# Patient Record
Sex: FEMALE | Race: WHITE
[De-identification: names, ages, dates, MRNs, and addresses within clinical notes are randomized per-mention and may not be internally consistent; named-entity substitution may affect disease eponyms.]

---

## 2017-02-09 ENCOUNTER — HOSPITAL ENCOUNTER (OUTPATIENT)
Age: 82
Discharge: HOME | End: 2017-02-09
Payer: MEDICARE

## 2017-02-09 DIAGNOSIS — E11.9: Primary | ICD-10-CM

## 2017-02-22 ENCOUNTER — HOSPITAL ENCOUNTER (OUTPATIENT)
Age: 82
Discharge: HOME | End: 2017-02-22
Payer: MEDICARE

## 2017-02-22 DIAGNOSIS — R31.9: Primary | ICD-10-CM

## 2017-06-15 ENCOUNTER — HOSPITAL ENCOUNTER (OUTPATIENT)
Dept: HOSPITAL 76 - LAB.WCP | Age: 82
Discharge: HOME | End: 2017-06-15
Attending: PHYSICIAN ASSISTANT
Payer: MEDICARE

## 2017-06-15 DIAGNOSIS — E11.9: Primary | ICD-10-CM

## 2017-06-15 LAB
ALBUMIN/GLOB SERPL: 1.3 {RATIO} (ref 1–2.2)
ANION GAP SERPL CALCULATED.4IONS-SCNC: 8 MMOL/L (ref 6–13)
BILIRUB BLD-MCNC: 1.2 MG/DL (ref 0.2–1)
BUN SERPL-MCNC: 17 MG/DL (ref 6–20)
CALCIUM UR-MCNC: 9.4 MG/DL (ref 8.5–10.3)
CHLORIDE SERPL-SCNC: 106 MMOL/L (ref 101–111)
CO2 SERPL-SCNC: 27 MMOL/L (ref 21–32)
CREAT SERPLBLD-SCNC: 0.8 MG/DL (ref 0.4–1)
EST. AVERAGE GLUCOSE BLD GHB EST-MCNC: 166 MG/DL (ref 70–100)
GFRSERPLBLD MDRD-ARVRAT: 69 ML/MIN/{1.73_M2} (ref 89–?)
GLOBULIN SER-MCNC: 2.9 G/DL (ref 2.1–4.2)
GLUCOSE SERPL-MCNC: 126 MG/DL (ref 70–100)
HBA1C BLD-MCNC: 0.82 G/DL
POTASSIUM SERPL-SCNC: 4.2 MMOL/L (ref 3.5–5)
PROT SPEC-MCNC: 6.7 G/DL (ref 6.7–8.2)
SODIUM SERPLBLD-SCNC: 141 MMOL/L (ref 135–145)

## 2017-06-15 PROCEDURE — 80053 COMPREHEN METABOLIC PANEL: CPT

## 2017-06-15 PROCEDURE — 36415 COLL VENOUS BLD VENIPUNCTURE: CPT

## 2017-06-15 PROCEDURE — 83036 HEMOGLOBIN GLYCOSYLATED A1C: CPT

## 2017-09-20 ENCOUNTER — HOSPITAL ENCOUNTER (OUTPATIENT)
Dept: HOSPITAL 76 - LAB.WCP | Age: 82
Discharge: HOME | End: 2017-09-20
Attending: PHYSICIAN ASSISTANT
Payer: MEDICARE

## 2017-09-20 DIAGNOSIS — M19.90: ICD-10-CM

## 2017-09-20 DIAGNOSIS — I48.0: Primary | ICD-10-CM

## 2017-09-20 DIAGNOSIS — E11.9: ICD-10-CM

## 2017-09-20 LAB
ALBUMIN/GLOB SERPL: 1.2 {RATIO} (ref 1–2.2)
ANION GAP SERPL CALCULATED.4IONS-SCNC: 7 MMOL/L (ref 6–13)
BASOPHILS NFR BLD AUTO: 0.1 10^3/UL (ref 0–0.1)
BASOPHILS NFR BLD AUTO: 0.8 %
BILIRUB BLD-MCNC: 0.9 MG/DL (ref 0.2–1)
BUN SERPL-MCNC: 15 MG/DL (ref 6–20)
CALCIUM UR-MCNC: 8.9 MG/DL (ref 8.5–10.3)
CHLORIDE SERPL-SCNC: 104 MMOL/L (ref 101–111)
CHOLEST SERPL-MCNC: 164 MG/DL
CO2 SERPL-SCNC: 27 MMOL/L (ref 21–32)
CREAT SERPLBLD-SCNC: 0.7 MG/DL (ref 0.4–1)
EOSINOPHIL # BLD AUTO: 0.1 10^3/UL (ref 0–0.7)
EOSINOPHIL NFR BLD AUTO: 1.3 %
ERYTHROCYTE [DISTWIDTH] IN BLOOD BY AUTOMATED COUNT: 13.3 % (ref 12–15)
EST. AVERAGE GLUCOSE BLD GHB EST-MCNC: 180 MG/DL (ref 70–100)
GFRSERPLBLD MDRD-ARVRAT: 80 ML/MIN/{1.73_M2} (ref 89–?)
GLOBULIN SER-MCNC: 3 G/DL (ref 2.1–4.2)
GLUCOSE SERPL-MCNC: 143 MG/DL (ref 70–100)
HBA1C BLD-MCNC: 0.93 G/DL
HCT VFR BLD AUTO: 42.7 % (ref 37–47)
HDLC SERPL-MCNC: 66 MG/DL
HDLC SERPL: 2.5 {RATIO} (ref ?–4.4)
HGB UR QL STRIP: 14.5 G/DL (ref 12–16)
LDLC/HDLC SERPL: 1.2 {RATIO} (ref ?–4.4)
LYMPHOCYTES # SPEC AUTO: 2.4 10^3/UL (ref 1.5–3.5)
LYMPHOCYTES NFR BLD AUTO: 36 %
MCH RBC QN AUTO: 29.3 PG (ref 27–31)
MCHC RBC AUTO-ENTMCNC: 33.9 G/DL (ref 32–36)
MCV RBC AUTO: 86.5 FL (ref 81–99)
MONOCYTES # BLD AUTO: 0.5 10^3/UL (ref 0–1)
MONOCYTES NFR BLD AUTO: 7.9 %
NEUTROPHILS # BLD AUTO: 3.6 10^3/UL (ref 1.5–6.6)
NEUTROPHILS # SNV AUTO: 6.7 X10^3/UL (ref 4.8–10.8)
NEUTROPHILS NFR BLD AUTO: 54 %
NRBC # BLD AUTO: 0 /100WBC
PDW BLD AUTO: 8.6 FL (ref 7.9–10.8)
POTASSIUM SERPL-SCNC: 3.9 MMOL/L (ref 3.5–5)
PROT SPEC-MCNC: 6.7 G/DL (ref 6.7–8.2)
RBC MAR: 4.93 10^6/UL (ref 4.2–5.4)
SODIUM SERPLBLD-SCNC: 138 MMOL/L (ref 135–145)
TRIGL P FAST SERPL-MCNC: 106 MG/DL
VLDLC SERPL-SCNC: 21 MG/DL
WBC # BLD: 6.7 X10^3/UL

## 2017-09-20 PROCEDURE — 84443 ASSAY THYROID STIM HORMONE: CPT

## 2017-09-20 PROCEDURE — 80053 COMPREHEN METABOLIC PANEL: CPT

## 2017-09-20 PROCEDURE — 36415 COLL VENOUS BLD VENIPUNCTURE: CPT

## 2017-09-20 PROCEDURE — 80061 LIPID PANEL: CPT

## 2017-09-20 PROCEDURE — 85025 COMPLETE CBC W/AUTO DIFF WBC: CPT

## 2017-09-20 PROCEDURE — 83036 HEMOGLOBIN GLYCOSYLATED A1C: CPT

## 2017-09-28 ENCOUNTER — HOSPITAL ENCOUNTER (OUTPATIENT)
Dept: HOSPITAL 76 - DI | Age: 82
Discharge: HOME | End: 2017-09-28
Attending: PHYSICIAN ASSISTANT
Payer: MEDICARE

## 2017-09-28 DIAGNOSIS — Z12.31: Primary | ICD-10-CM

## 2017-09-28 DIAGNOSIS — Z80.3: ICD-10-CM

## 2017-09-28 PROCEDURE — 77067 SCR MAMMO BI INCL CAD: CPT

## 2017-09-29 NOTE — MAMMOGRAPHY REPORT
DIGITAL SCREENING MAMMOGRAM: 09/28/2017

 

CLINICAL INDICATION: An 83-year-old with family history of breast cancer, for screening. 

 

COMPARISON: 07/2016, 11/2013, 10/2012, 10/2011, 09/2010. 

 

TECHNIQUE:  Routine CC and MLO projections were obtained of the breasts.  

 

FINDINGS:  Scattered fibroglandular tissue is present within the breasts. There are no dominant lizeth
s, suspicious microcalcifications, or secondary signs of malignancy. In comparison to the previous st
udies, there are no significant changes.

 

ASSESSMENT:  NO MAMMOGRAPHIC EVIDENCE OF MALIGNANCY.  NO SIGNIFICANT INTERVAL CHANGES.

 

RECOMMENDATION:  Screening mammography is recommended annually.

 

BIRADS category 1 - negative.

 

STANDARD QUALIFYING STATEMENTS

1. This examination was reviewed with the aid of Computed-Aided Detection (CAD).

2. A negative or benign imaging report should not delay biopsy if clinically suspicious findings are 
present. Consider surgical consultation if warranted. More than 5% of cancers are not identified by i
maging.

3. Dense breasts may obscure an underlying neoplasm.

 

 

 

DD:09/29/2017 13:46:42  DT: 09/29/2017 15:31  JOB #: [id number]  EXT JOB #:[id number]

## 2018-01-05 ENCOUNTER — HOSPITAL ENCOUNTER (OUTPATIENT)
Dept: HOSPITAL 76 - LAB.R | Age: 83
Discharge: HOME | End: 2018-01-05
Attending: PHYSICIAN ASSISTANT
Payer: MEDICARE

## 2018-01-05 DIAGNOSIS — R31.9: Primary | ICD-10-CM

## 2018-01-05 PROCEDURE — 87086 URINE CULTURE/COLONY COUNT: CPT

## 2018-01-05 PROCEDURE — 81001 URINALYSIS AUTO W/SCOPE: CPT

## 2018-01-09 ENCOUNTER — HOSPITAL ENCOUNTER (OUTPATIENT)
Dept: HOSPITAL 76 - LAB.WCP | Age: 83
Discharge: HOME | End: 2018-01-09
Attending: PHYSICIAN ASSISTANT
Payer: MEDICARE

## 2018-01-09 DIAGNOSIS — R31.9: Primary | ICD-10-CM

## 2018-01-09 LAB
CLARITY UR REFRACT.AUTO: CLEAR
GLUCOSE UR QL STRIP.AUTO: NEGATIVE MG/DL
KETONES UR QL STRIP.AUTO: NEGATIVE MG/DL
NITRITE UR QL STRIP.AUTO: NEGATIVE
PH UR STRIP.AUTO: 6 PH (ref 5–7.5)
PROT UR STRIP.AUTO-MCNC: NEGATIVE MG/DL
RBC # UR STRIP.AUTO: (no result) /UL
RBC # URNS HPF: (no result) /HPF (ref 0–5)
SP GR UR STRIP.AUTO: 1.01 (ref 1–1.03)
SQUAMOUS URNS QL MICRO: (no result)
UROBILINOGEN UR QL STRIP.AUTO: (no result) E.U./DL
UROBILINOGEN UR STRIP.AUTO-MCNC: NEGATIVE MG/DL

## 2018-01-09 PROCEDURE — 87086 URINE CULTURE/COLONY COUNT: CPT

## 2018-01-09 PROCEDURE — 81001 URINALYSIS AUTO W/SCOPE: CPT

## 2018-04-09 ENCOUNTER — HOSPITAL ENCOUNTER (OUTPATIENT)
Dept: HOSPITAL 76 - LAB.WCP | Age: 83
Discharge: HOME | End: 2018-04-09
Attending: PHYSICIAN ASSISTANT
Payer: MEDICARE

## 2018-04-09 DIAGNOSIS — E11.9: Primary | ICD-10-CM

## 2018-04-09 LAB
ALBUMIN DIAFP-MCNC: 3.7 G/DL (ref 3.2–5.5)
ALBUMIN/GLOB SERPL: 1.3 {RATIO} (ref 1–2.2)
ALP SERPL-CCNC: 75 IU/L (ref 42–121)
ALT SERPL W P-5'-P-CCNC: 21 IU/L (ref 10–60)
ANION GAP SERPL CALCULATED.4IONS-SCNC: 5 MMOL/L (ref 6–13)
AST SERPL W P-5'-P-CCNC: 24 IU/L (ref 10–42)
BILIRUB BLD-MCNC: 0.9 MG/DL (ref 0.2–1)
BUN SERPL-MCNC: 16 MG/DL (ref 6–20)
CALCIUM UR-MCNC: 8.8 MG/DL (ref 8.5–10.3)
CHLORIDE SERPL-SCNC: 105 MMOL/L (ref 101–111)
CHOLEST SERPL-MCNC: 152 MG/DL
CO2 SERPL-SCNC: 29 MMOL/L (ref 21–32)
CREAT SERPLBLD-SCNC: 0.8 MG/DL (ref 0.4–1)
EST. AVERAGE GLUCOSE BLD GHB EST-MCNC: 177 MG/DL (ref 70–100)
GFRSERPLBLD MDRD-ARVRAT: 69 ML/MIN/{1.73_M2} (ref 89–?)
GLOBULIN SER-MCNC: 2.9 G/DL (ref 2.1–4.2)
GLUCOSE SERPL-MCNC: 138 MG/DL (ref 70–100)
HB2 TOTAL: 16.2 G/DL
HBA1C BLD-MCNC: 1 G/DL
HDLC SERPL-MCNC: 62 MG/DL
HDLC SERPL: 2.5 {RATIO} (ref ?–4.4)
HEMOGLOBIN A1C %: 7.8 % (ref 4.6–6.2)
LDLC SERPL CALC-MCNC: 76 MG/DL
LDLC/HDLC SERPL: 1.2 {RATIO} (ref ?–4.4)
PROT SPEC-MCNC: 6.6 G/DL (ref 6.7–8.2)
SODIUM SERPLBLD-SCNC: 139 MMOL/L (ref 135–145)
VLDLC SERPL-SCNC: 14 MG/DL

## 2018-04-09 PROCEDURE — 83036 HEMOGLOBIN GLYCOSYLATED A1C: CPT

## 2018-04-09 PROCEDURE — 83721 ASSAY OF BLOOD LIPOPROTEIN: CPT

## 2018-04-09 PROCEDURE — 36415 COLL VENOUS BLD VENIPUNCTURE: CPT

## 2018-04-09 PROCEDURE — 80053 COMPREHEN METABOLIC PANEL: CPT

## 2018-04-09 PROCEDURE — 80061 LIPID PANEL: CPT

## 2018-07-09 ENCOUNTER — HOSPITAL ENCOUNTER (OUTPATIENT)
Dept: HOSPITAL 76 - LAB.WCP | Age: 83
End: 2018-07-09
Attending: PHYSICIAN ASSISTANT
Payer: MEDICARE

## 2018-07-09 DIAGNOSIS — E11.9: Primary | ICD-10-CM

## 2018-07-09 LAB
ANION GAP SERPL CALCULATED.4IONS-SCNC: 7 MMOL/L (ref 6–13)
BUN SERPL-MCNC: 16 MG/DL (ref 6–20)
CALCIUM UR-MCNC: 9.5 MG/DL (ref 8.5–10.3)
CHLORIDE SERPL-SCNC: 106 MMOL/L (ref 101–111)
CO2 SERPL-SCNC: 26 MMOL/L (ref 21–32)
CREAT SERPLBLD-SCNC: 0.8 MG/DL (ref 0.4–1)
EST. AVERAGE GLUCOSE BLD GHB EST-MCNC: 163 MG/DL (ref 70–100)
GFRSERPLBLD MDRD-ARVRAT: 69 ML/MIN/{1.73_M2} (ref 89–?)
GLUCOSE SERPL-MCNC: 127 MG/DL (ref 70–100)
HB2 TOTAL: 15.6 G/DL
HBA1C BLD-MCNC: 0.88 G/DL
HEMOGLOBIN A1C %: 7.3 % (ref 4.6–6.2)
SODIUM SERPLBLD-SCNC: 139 MMOL/L (ref 135–145)

## 2018-07-09 PROCEDURE — 83036 HEMOGLOBIN GLYCOSYLATED A1C: CPT

## 2018-07-09 PROCEDURE — 36415 COLL VENOUS BLD VENIPUNCTURE: CPT

## 2018-07-09 PROCEDURE — 80048 BASIC METABOLIC PNL TOTAL CA: CPT

## 2018-10-12 ENCOUNTER — HOSPITAL ENCOUNTER (OUTPATIENT)
Dept: HOSPITAL 76 - LAB.WCP | Age: 83
Discharge: HOME | End: 2018-10-12
Attending: PHYSICIAN ASSISTANT
Payer: MEDICARE

## 2018-10-12 DIAGNOSIS — E11.9: Primary | ICD-10-CM

## 2018-10-12 LAB
ALBUMIN DIAFP-MCNC: 3.8 G/DL (ref 3.2–5.5)
ALBUMIN/GLOB SERPL: 1.4 {RATIO} (ref 1–2.2)
ALP SERPL-CCNC: 70 IU/L (ref 42–121)
ALT SERPL W P-5'-P-CCNC: 31 IU/L (ref 10–60)
ANION GAP SERPL CALCULATED.4IONS-SCNC: 7 MMOL/L (ref 6–13)
AST SERPL W P-5'-P-CCNC: 28 IU/L (ref 10–42)
BILIRUB BLD-MCNC: 0.6 MG/DL (ref 0.2–1)
BUN SERPL-MCNC: 21 MG/DL (ref 6–20)
CALCIUM UR-MCNC: 9 MG/DL (ref 8.5–10.3)
CHLORIDE SERPL-SCNC: 106 MMOL/L (ref 101–111)
CHOLEST SERPL-MCNC: 157 MG/DL
CO2 SERPL-SCNC: 27 MMOL/L (ref 21–32)
CREAT SERPLBLD-SCNC: 0.8 MG/DL (ref 0.4–1)
EST. AVERAGE GLUCOSE BLD GHB EST-MCNC: 183 MG/DL (ref 70–100)
GFRSERPLBLD MDRD-ARVRAT: 68 ML/MIN/{1.73_M2} (ref 89–?)
GLOBULIN SER-MCNC: 2.8 G/DL (ref 2.1–4.2)
GLUCOSE SERPL-MCNC: 149 MG/DL (ref 70–100)
HB2 TOTAL: 15.8 G/DL
HBA1C BLD-MCNC: 1.01 G/DL
HDLC SERPL-MCNC: 66 MG/DL
HDLC SERPL: 2.4 {RATIO} (ref ?–4.4)
HEMOGLOBIN A1C %: 8 % (ref 4.6–6.2)
LDLC SERPL CALC-MCNC: 75 MG/DL
LDLC/HDLC SERPL: 1.1 {RATIO} (ref ?–4.4)
PROT SPEC-MCNC: 6.6 G/DL (ref 6.7–8.2)
SODIUM SERPLBLD-SCNC: 140 MMOL/L (ref 135–145)
VLDLC SERPL-SCNC: 16 MG/DL

## 2018-10-12 PROCEDURE — 80061 LIPID PANEL: CPT

## 2018-10-12 PROCEDURE — 83721 ASSAY OF BLOOD LIPOPROTEIN: CPT

## 2018-10-12 PROCEDURE — 36415 COLL VENOUS BLD VENIPUNCTURE: CPT

## 2018-10-12 PROCEDURE — 83036 HEMOGLOBIN GLYCOSYLATED A1C: CPT

## 2018-10-12 PROCEDURE — 80053 COMPREHEN METABOLIC PANEL: CPT

## 2018-12-27 ENCOUNTER — HOSPITAL ENCOUNTER (OUTPATIENT)
Dept: HOSPITAL 76 - LAB | Age: 83
Discharge: HOME | End: 2018-12-27
Attending: SURGERY
Payer: MEDICARE

## 2018-12-27 DIAGNOSIS — Z86.010: ICD-10-CM

## 2018-12-27 DIAGNOSIS — K52.9: Primary | ICD-10-CM

## 2018-12-27 LAB
BASOPHILS NFR BLD AUTO: 0 10^3/UL (ref 0–0.1)
BASOPHILS NFR BLD AUTO: 0.7 %
EOSINOPHIL # BLD AUTO: 0.1 10^3/UL (ref 0–0.7)
EOSINOPHIL NFR BLD AUTO: 1.3 %
ERYTHROCYTE [DISTWIDTH] IN BLOOD BY AUTOMATED COUNT: 13.6 % (ref 12–15)
HGB UR QL STRIP: 14.1 G/DL (ref 12–16)
LYMPHOCYTES # SPEC AUTO: 2.2 10^3/UL (ref 1.5–3.5)
LYMPHOCYTES NFR BLD AUTO: 35.6 %
MCH RBC QN AUTO: 30.4 PG (ref 27–31)
MCHC RBC AUTO-ENTMCNC: 34.7 G/DL (ref 32–36)
MCV RBC AUTO: 87.6 FL (ref 81–99)
MONOCYTES # BLD AUTO: 0.5 10^3/UL (ref 0–1)
MONOCYTES NFR BLD AUTO: 8 %
NEUTROPHILS # BLD AUTO: 3.4 10^3/UL (ref 1.5–6.6)
NEUTROPHILS # SNV AUTO: 6.3 X10^3/UL (ref 4.8–10.8)
NEUTROPHILS NFR BLD AUTO: 54.4 %
PDW BLD AUTO: 8.1 FL (ref 7.9–10.8)
PLATELET # BLD: 242 10^3/UL (ref 130–450)
RBC MAR: 4.65 10^6/UL (ref 4.2–5.4)

## 2018-12-27 PROCEDURE — 36415 COLL VENOUS BLD VENIPUNCTURE: CPT

## 2018-12-27 PROCEDURE — 85025 COMPLETE CBC W/AUTO DIFF WBC: CPT

## 2019-01-02 ENCOUNTER — HOSPITAL ENCOUNTER (OUTPATIENT)
Dept: HOSPITAL 76 - LAB.R | Age: 84
Discharge: HOME | End: 2019-01-02
Attending: SURGERY
Payer: MEDICARE

## 2019-01-02 DIAGNOSIS — K52.9: Primary | ICD-10-CM

## 2019-01-02 DIAGNOSIS — Z12.10: ICD-10-CM

## 2019-01-02 PROCEDURE — 82274 ASSAY TEST FOR BLOOD FECAL: CPT

## 2019-01-25 ENCOUNTER — HOSPITAL ENCOUNTER (OUTPATIENT)
Dept: HOSPITAL 76 - LAB.WCP | Age: 84
Discharge: HOME | End: 2019-01-25
Attending: PHYSICIAN ASSISTANT
Payer: MEDICARE

## 2019-01-25 DIAGNOSIS — E11.9: Primary | ICD-10-CM

## 2019-01-25 LAB
ANION GAP SERPL CALCULATED.4IONS-SCNC: 5 MMOL/L (ref 6–13)
BUN SERPL-MCNC: 17 MG/DL (ref 6–20)
CALCIUM UR-MCNC: 8.9 MG/DL (ref 8.5–10.3)
CHLORIDE SERPL-SCNC: 105 MMOL/L (ref 101–111)
CO2 SERPL-SCNC: 28 MMOL/L (ref 21–32)
CREAT SERPLBLD-SCNC: 0.7 MG/DL (ref 0.4–1)
EST. AVERAGE GLUCOSE BLD GHB EST-MCNC: 166 MG/DL (ref 70–100)
GFRSERPLBLD MDRD-ARVRAT: 80 ML/MIN/{1.73_M2} (ref 89–?)
GLUCOSE SERPL-MCNC: 160 MG/DL (ref 70–100)
HB2 TOTAL: 14.1 G/DL
HBA1C BLD-MCNC: 0.81 G/DL
HEMOGLOBIN A1C %: 7.4 % (ref 4.6–6.2)
SODIUM SERPLBLD-SCNC: 138 MMOL/L (ref 135–145)

## 2019-01-25 PROCEDURE — 36415 COLL VENOUS BLD VENIPUNCTURE: CPT

## 2019-01-25 PROCEDURE — 83036 HEMOGLOBIN GLYCOSYLATED A1C: CPT

## 2019-01-25 PROCEDURE — 80048 BASIC METABOLIC PNL TOTAL CA: CPT

## 2019-04-22 ENCOUNTER — HOSPITAL ENCOUNTER (OUTPATIENT)
Dept: HOSPITAL 76 - LAB.WCP | Age: 84
Discharge: HOME | End: 2019-04-22
Attending: PHYSICIAN ASSISTANT
Payer: MEDICARE

## 2019-04-22 DIAGNOSIS — E11.9: Primary | ICD-10-CM

## 2019-04-22 LAB
ANION GAP SERPL CALCULATED.4IONS-SCNC: 5 MMOL/L (ref 6–13)
BUN SERPL-MCNC: 18 MG/DL (ref 6–20)
CALCIUM UR-MCNC: 8.8 MG/DL (ref 8.5–10.3)
CHLORIDE SERPL-SCNC: 106 MMOL/L (ref 101–111)
CO2 SERPL-SCNC: 27 MMOL/L (ref 21–32)
CREAT SERPLBLD-SCNC: 0.7 MG/DL (ref 0.4–1)
EST. AVERAGE GLUCOSE BLD GHB EST-MCNC: 171 MG/DL (ref 70–100)
GFRSERPLBLD MDRD-ARVRAT: 80 ML/MIN/{1.73_M2} (ref 89–?)
GLUCOSE SERPL-MCNC: 155 MG/DL (ref 70–100)
HB2 TOTAL: 15.5 G/DL
HBA1C BLD-MCNC: 0.92 G/DL
HEMOGLOBIN A1C %: 7.6 % (ref 4.6–6.2)
SODIUM SERPLBLD-SCNC: 138 MMOL/L (ref 135–145)

## 2019-04-22 PROCEDURE — 80048 BASIC METABOLIC PNL TOTAL CA: CPT

## 2019-04-22 PROCEDURE — 83036 HEMOGLOBIN GLYCOSYLATED A1C: CPT

## 2019-04-22 PROCEDURE — 36415 COLL VENOUS BLD VENIPUNCTURE: CPT

## 2019-07-09 ENCOUNTER — HOSPITAL ENCOUNTER (EMERGENCY)
Dept: HOSPITAL 76 - ED | Age: 84
Discharge: HOME | End: 2019-07-09
Payer: MEDICARE

## 2019-07-09 VITALS — DIASTOLIC BLOOD PRESSURE: 81 MMHG | SYSTOLIC BLOOD PRESSURE: 161 MMHG
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
## 2019-07-09 DIAGNOSIS — G89.29: ICD-10-CM
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
## 2019-07-09 DIAGNOSIS — E11.9: ICD-10-CM

## 2019-07-09 DIAGNOSIS — M25.562: ICD-10-CM
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
## 2019-07-09 DIAGNOSIS — Z79.01: ICD-10-CM

## 2019-07-09 DIAGNOSIS — S93.601A: Primary | ICD-10-CM

## 2019-07-09 DIAGNOSIS — I48.91: ICD-10-CM

## 2019-07-09 PROCEDURE — 99282 EMERGENCY DEPT VISIT SF MDM: CPT

## 2019-07-09 PROCEDURE — 99283 EMERGENCY DEPT VISIT LOW MDM: CPT

## 2019-07-09 NOTE — ED PHYSICIAN DOCUMENTATION
PD HPI LOWER EXT INJURY





- Stated complaint


Stated Complaint: LT KNEE PX





- Chief complaint


Chief Complaint: Ext Problem





- History obtained from


History obtained from: Patient





- History of Present Illness


PD HPI LOW EXT INJURY LOCATION: Right, Foot


Type of injury: Other (twisted it going up the stairs)


Where injury occurred: Home


Timing - onset: How many days ago (10)


Timing - duration: Days (10)


Severity Comments: mild


Improved by: Rest


Worsened by: Palpating


Associated symptoms: No: Weakness, Numbness, Tingling, Swelling


Contributing factors: Prior ortho surgery (to right 5th phalanx and metatarsel)


Recently seen: Not recently seen





- Treatment prior to arrival


Treatment prior to arrival: 





ace wrap





Review of Systems


Ten Systems: 10 systems reviewed and negative


Constitutional: denies: Fever


Cardiac: reports: Reviewed and negative


Respiratory: reports: Reviewed and negative


GI: reports: Reviewed and negative


Skin: reports: Reviewed and negative


Musculoskeletal: reports: Joint pain.  denies: Neck pain, Back pain, Extremity 

pain


Neurologic: denies: Focal weakness, Numbness, Syncope, Head injury, LOC





PD PAST MEDICAL HISTORY





- Past Medical History


Past Medical History: Yes


Cardiovascular: Coronary artery disease, Atrial fibrillation


Respiratory: None


Endocrine/Autoimmune: Type 2 diabetes


GI: GERD, Other


: Frequency, Other


HEENT: Macular degeneration


Psych: None


Musculoskeletal: Osteoporosis, Chronic back pain, Other


Derm: None





- Past Surgical History


Past Surgical History: Yes


General: Colonoscopy


Ortho: Other


/GYN: Hysterectomy


Cardiovascular: Coronary stent


HEENT: Cataracts





- Present Medications


Home Medications: 


                                Ambulatory Orders











 Medication  Instructions  Recorded  Confirmed


 


Ezetimibe [Zetia] 10 mg ORAL DAILY 07/15/15 02/15/16


 


Fesoterodine Fumarate [Toviaz] 4 mg ORAL DAILY 07/15/15 02/15/16


 


Fluticasone [Flonase] 2 spray JESSICA DAILY 07/15/15 02/15/16


 


Multivit-Min/FA/Lycopen/Lutein 1 each ORAL DAILY 07/15/15 02/15/16





[Centrum Silver Tablet]   


 


RX: Acyclovir 800 mg ORAL DAILY PRN 07/15/15 02/15/16


 


RX: Lisinopril 10 mg ORAL DAILY 07/15/15 02/15/16


 


RX: Metoprolol Succinate 25 mg ORAL BID 07/15/15 02/15/16


 


Rivaroxaban [Xarelto] 20 mg ORAL DAILY 07/15/15 02/15/16


 


Rosuvastatin Calcium [Crestor] 10 mg ORAL DAILY 07/15/15 02/15/16


 


Omeprazole [PriLOSEC]  02/15/16 02/15/16


 


RX: HYDROcod/ACETAM 5/325 [Norco 1 - 2 ea PO Q6H PRN #15 tablet 02/15/16 





5/325]   














- Allergies


Allergies/Adverse Reactions: 


                                    Allergies











Allergy/AdvReac Type Severity Reaction Status Date / Time


 


No Known Drug Allergies Allergy   Verified 07/09/19 11:20














- Social History


Does the pt smoke?: No


Smoking Status: Never smoker


Does the pt drink ETOH?: Yes


Does the pt have substance abuse?: No





- Immunizations


Immunizations are current?: Yes





- POLST


Patient has POLST: Yes





PD ED PE NORMAL





- Vitals


Vital signs reviewed: Yes





- General


General: Alert and oriented X 3, No acute distress, Well developed/nourished





- HEENT


HEENT: Atraumatic





- Neck


Neck: Supple, no meningeal sign





- Cardiac


Cardiac: RRR





- Respiratory


Respiratory: No respiratory distress





- Abdomen


Abdomen: Non distended





- Female 


Female : Deferred





- Rectal


Rectal: Deferred





- Derm


Derm: Normal color, Warm and dry, No rash





- Extremities


Extremities: No deformity, No edema





- Neuro


Neuro: Alert and oriented X 3


Eye Opening: Spontaneous


Motor: Obeys Commands


Verbal: Oriented


GCS Score: 15





- Psych


Psych: Normal mood, Normal affect





PD ED PE EXPANDED





- Extremities


Extremities: Left knee (no deformity, no swelling, tenderness diffusely but full

ROM with some pain. No ligamentous laxity noted. ).  No: Deformity, Tenderness





Results





- Vitals


Vitals: 


                                     Oxygen











O2 Source                      Room air

















- Rads (name of study)


  ** L knee xray


Radiology: Final report received (negative for acute injury or disease )





PD MEDICAL DECISION MAKING





- ED course


Complexity details: reviewed results, re-evaluated patient, considered 

differential, d/w patient, d/w family


ED course: 





ddx - contusion, sprain, ligamentous knee injury, meniscal injury





85 y/o F with hx and exam as documented. 


No significant trauma, minor twisting injury.


Hx of chronic knee pain, likely acute exacerbation of arthritis.


no fx or dislocation on xray. 


Advised RICE and continued supportive care at home with outpt f/u





Departure





- Departure


Disposition: 01 Home, Self Care


Clinical Impression: 


 Foot sprain





Condition: Stable


Record reviewed to determine appropriate education?: Yes


Instructions:  ED Sprain Foot


Follow-Up: 


Daja Sargent PA-C [Primary Care Provider] - 


Comments: 


There was no evidence of acute fracture or dislocation of your foot.


You should use ice for 20 minutes at a time when resting at home, 2 to 3 times a

day. Take tylenol as needed for pain.


You can walk on the foot as tolerated. 


Follow up with your regular doctor if your symptoms persist after another week 

or 2.


Discharge Date/Time: 07/09/19 13:59

## 2019-07-09 NOTE — XRAY REPORT
Reason:  Pain and swelling after fall

Procedure Date:  07/09/2019   

Accession Number:  [id number] / [id number]                    

Procedure:  XR  - Foot 3 View RT CPT Code:  

 

FULL RESULT:

 

 

EXAM:

RIGHT FOOT RADIOGRAPHY

 

EXAM DATE: 7/9/2019 11:38 AM.

 

CLINICAL HISTORY: Pain and swelling after fall.

 

COMPARISON: None.

 

TECHNIQUE: 3 views.

 

FINDINGS:

Bones: The bones are qualitatively osteopenic; this limits evaluation for 

underlying fractures or masses. The distal portion of the fifth proximal 

phalanx is absent with a soft tissue gap between the base of the fifth 

proximal phalangeal base and the osseous remainder of the fifth ray. Seen 

only on the oblique view, there is irregularity of the third distal 

metatarsal head, which should be correlated for point tenderness. 

Otherwise, there is no definite acute fracture.

 

Joints: There are midfoot degenerative changes, poorly defined due to 

osteopenia. No definite subluxation.

 

Soft Tissues: Normal. No soft tissue swelling.

IMPRESSION:

Correlate the above described one view finding to pain at the ball of the 

third toe.

 

Abnormal absence of the distal aspect of the fifth proximal phalanx 

should be correlated to surgical history. In absence of prior surgical 

intervention, bony destruction due to soft tissue versus infection should 

be suspected.

 

[facility]

## 2019-10-16 ENCOUNTER — HOSPITAL ENCOUNTER (OUTPATIENT)
Dept: HOSPITAL 76 - LAB.WCP | Age: 84
Discharge: HOME | End: 2019-10-16
Attending: PHYSICIAN ASSISTANT
Payer: MEDICARE

## 2019-10-16 DIAGNOSIS — E11.9: Primary | ICD-10-CM

## 2019-10-16 LAB
ANION GAP SERPL CALCULATED.4IONS-SCNC: 8 MMOL/L (ref 6–13)
BUN SERPL-MCNC: 15 MG/DL (ref 6–20)
CALCIUM UR-MCNC: 9.6 MG/DL (ref 8.5–10.3)
CHLORIDE SERPL-SCNC: 106 MMOL/L (ref 101–111)
CO2 SERPL-SCNC: 28 MMOL/L (ref 21–32)
CREAT SERPLBLD-SCNC: 0.8 MG/DL (ref 0.4–1)
EST. AVERAGE GLUCOSE BLD GHB EST-MCNC: 171 MG/DL (ref 70–100)
GFRSERPLBLD MDRD-ARVRAT: 68 ML/MIN/{1.73_M2} (ref 89–?)
GLUCOSE SERPL-MCNC: 138 MG/DL (ref 70–100)
HB2 TOTAL: 14.5 G/DL
HBA1C BLD-MCNC: 0.86 G/DL
HEMOGLOBIN A1C %: 7.6 % (ref 4.6–6.2)
SODIUM SERPLBLD-SCNC: 142 MMOL/L (ref 135–145)

## 2019-10-16 PROCEDURE — 83036 HEMOGLOBIN GLYCOSYLATED A1C: CPT

## 2019-10-16 PROCEDURE — 36415 COLL VENOUS BLD VENIPUNCTURE: CPT

## 2019-10-16 PROCEDURE — 80048 BASIC METABOLIC PNL TOTAL CA: CPT

## 2019-12-31 ENCOUNTER — HOSPITAL ENCOUNTER (OUTPATIENT)
Dept: HOSPITAL 76 - DI | Age: 84
Discharge: HOME | End: 2019-12-31
Attending: GENERAL ACUTE CARE HOSPITAL
Payer: MEDICARE

## 2019-12-31 DIAGNOSIS — Z12.31: Primary | ICD-10-CM

## 2019-12-31 PROCEDURE — 77067 SCR MAMMO BI INCL CAD: CPT

## 2020-01-02 NOTE — MAMMOGRAPHY REPORT
Reason:  ROUTINE SCREENING

Procedure Date:  12/31/2019   

Accession Number:  [id number] / [id number]                    

Procedure:  MARY - Screening Mammo Dig Bilat CPT Code:  

 

***Final Report***

 

 

FULL RESULT:

 

 

EXAM: Screening Mammo Dig Bilat

 

DATE: 12/31/2019 9:29 AM

 

CLINICAL HISTORY:  Screening encounter.

 

TECHNIQUE: (B) - Bilateral  CC and MLO views were obtained.

 

COMPARISON: 9/20/2017 through 9/13/2010.

 

PARENCHYMAL PATTERN: (A) - The breast(s) demonstrate(s) scattered 

fibroglandular densities.

 

FINDINGS:

There are no suspicious masses, calcifications, or areas of distortion.

 

IMPRESSION: Negative examination. BI-RADS category 1.

 

RECOMMENDATION: (ANNUAL)  - Recommend routine annual screening 

mammography.

 

BI-RADS CATEGORY: (1) - Negative.

 

STANDARD QUALIFYING STATEMENTS:

1.  This examination was not reviewed with the aid of Computer-Aided 

Detection (CAD).

2.  A negative or benign  imaging report should not preclude biopsy if 

clinically suspicious findings are present.

3.  Dense breasts may obscure an underlying neoplasm.

4. This examination was reviewed without the aid of 3D breast imaging 

(tomosynthesis).

## 2020-02-13 ENCOUNTER — HOSPITAL ENCOUNTER (OUTPATIENT)
Dept: HOSPITAL 76 - LAB | Age: 85
Discharge: HOME | End: 2020-02-13
Attending: PHYSICIAN ASSISTANT
Payer: MEDICARE

## 2020-02-13 DIAGNOSIS — K52.9: Primary | ICD-10-CM

## 2020-02-13 LAB
ALBUMIN DIAFP-MCNC: 3.9 G/DL (ref 3.2–5.5)
ALBUMIN/GLOB SERPL: 1.6 {RATIO} (ref 1–2.2)
ALP SERPL-CCNC: 63 IU/L (ref 42–121)
ALT SERPL W P-5'-P-CCNC: 32 IU/L (ref 10–60)
ANION GAP SERPL CALCULATED.4IONS-SCNC: 11 MMOL/L (ref 6–13)
AST SERPL W P-5'-P-CCNC: 38 IU/L (ref 10–42)
BASOPHILS NFR BLD AUTO: 0 10^3/UL (ref 0–0.1)
BASOPHILS NFR BLD AUTO: 0.5 %
BILIRUB BLD-MCNC: 1.4 MG/DL (ref 0.2–1)
BUN SERPL-MCNC: 17 MG/DL (ref 6–20)
CALCIUM UR-MCNC: 9.3 MG/DL (ref 8.5–10.3)
CHLORIDE SERPL-SCNC: 105 MMOL/L (ref 101–111)
CO2 SERPL-SCNC: 27 MMOL/L (ref 21–32)
CREAT SERPLBLD-SCNC: 0.9 MG/DL (ref 0.4–1)
EOSINOPHIL # BLD AUTO: 0.1 10^3/UL (ref 0–0.7)
EOSINOPHIL NFR BLD AUTO: 1 %
ERYTHROCYTE [DISTWIDTH] IN BLOOD BY AUTOMATED COUNT: 11.8 % (ref 12–15)
GFRSERPLBLD MDRD-ARVRAT: 60 ML/MIN/{1.73_M2} (ref 89–?)
GLOBULIN SER-MCNC: 2.5 G/DL (ref 2.1–4.2)
GLUCOSE SERPL-MCNC: 106 MG/DL (ref 70–100)
HGB UR QL STRIP: 14.1 G/DL (ref 12–16)
LIPASE SERPL-CCNC: 36 U/L (ref 22–51)
LYMPHOCYTES # SPEC AUTO: 2 10^3/UL (ref 1.5–3.5)
LYMPHOCYTES NFR BLD AUTO: 34.3 %
MCH RBC QN AUTO: 29 PG (ref 27–31)
MCHC RBC AUTO-ENTMCNC: 33.7 G/DL (ref 32–36)
MCV RBC AUTO: 86 FL (ref 81–99)
MONOCYTES # BLD AUTO: 0.6 10^3/UL (ref 0–1)
MONOCYTES NFR BLD AUTO: 10.8 %
NEUTROPHILS # BLD AUTO: 3.1 10^3/UL (ref 1.5–6.6)
NEUTROPHILS # SNV AUTO: 5.8 X10^3/UL (ref 4.8–10.8)
NEUTROPHILS NFR BLD AUTO: 53.2 %
PDW BLD AUTO: 10.2 FL (ref 7.9–10.8)
PLATELET # BLD: 251 10^3/UL (ref 130–450)
PROT SPEC-MCNC: 6.4 G/DL (ref 6.7–8.2)
RBC MAR: 4.87 10^6/UL (ref 4.2–5.4)
SODIUM SERPLBLD-SCNC: 143 MMOL/L (ref 135–145)

## 2020-02-13 PROCEDURE — 80053 COMPREHEN METABOLIC PANEL: CPT

## 2020-02-13 PROCEDURE — 85025 COMPLETE CBC W/AUTO DIFF WBC: CPT

## 2020-02-13 PROCEDURE — 36415 COLL VENOUS BLD VENIPUNCTURE: CPT

## 2020-02-13 PROCEDURE — 83690 ASSAY OF LIPASE: CPT

## 2020-02-27 ENCOUNTER — HOSPITAL ENCOUNTER (OUTPATIENT)
Dept: HOSPITAL 76 - DI | Age: 85
Discharge: HOME | End: 2020-02-27
Attending: PHYSICIAN ASSISTANT
Payer: MEDICARE

## 2020-02-27 DIAGNOSIS — K52.9: Primary | ICD-10-CM

## 2020-02-27 DIAGNOSIS — K76.9: ICD-10-CM

## 2020-02-27 DIAGNOSIS — K86.89: ICD-10-CM

## 2020-02-27 PROCEDURE — 76700 US EXAM ABDOM COMPLETE: CPT

## 2020-02-28 ENCOUNTER — HOSPITAL ENCOUNTER (OUTPATIENT)
Dept: HOSPITAL 76 - LAB.WCP | Age: 85
Discharge: HOME | End: 2020-02-28
Attending: PHYSICIAN ASSISTANT
Payer: MEDICARE

## 2020-02-28 DIAGNOSIS — E11.9: Primary | ICD-10-CM

## 2020-02-28 LAB
ALBUMIN DIAFP-MCNC: 3.5 G/DL (ref 3.2–5.5)
ALBUMIN/GLOB SERPL: 1.3 {RATIO} (ref 1–2.2)
ALP SERPL-CCNC: 61 IU/L (ref 42–121)
ALT SERPL W P-5'-P-CCNC: 56 IU/L (ref 10–60)
ANION GAP SERPL CALCULATED.4IONS-SCNC: 4 MMOL/L (ref 6–13)
AST SERPL W P-5'-P-CCNC: 46 IU/L (ref 10–42)
BILIRUB BLD-MCNC: 0.9 MG/DL (ref 0.2–1)
BUN SERPL-MCNC: 16 MG/DL (ref 6–20)
CALCIUM UR-MCNC: 8.9 MG/DL (ref 8.5–10.3)
CHLORIDE SERPL-SCNC: 108 MMOL/L (ref 101–111)
CHOLEST SERPL-MCNC: 120 MG/DL
CO2 SERPL-SCNC: 29 MMOL/L (ref 21–32)
CREAT SERPLBLD-SCNC: 0.8 MG/DL (ref 0.4–1)
EST. AVERAGE GLUCOSE BLD GHB EST-MCNC: 160 MG/DL (ref 70–100)
GFRSERPLBLD MDRD-ARVRAT: 68 ML/MIN/{1.73_M2} (ref 89–?)
GLOBULIN SER-MCNC: 2.7 G/DL (ref 2.1–4.2)
GLUCOSE SERPL-MCNC: 112 MG/DL (ref 70–100)
HB2 TOTAL: 14 G/DL
HBA1C BLD-MCNC: 0.78 G/DL
HDLC SERPL-MCNC: 50 MG/DL
HDLC SERPL: 2.4 {RATIO} (ref ?–4.4)
HEMOGLOBIN A1C %: 7.2 % (ref 4.6–6.2)
LDLC SERPL CALC-MCNC: 60 MG/DL
LDLC/HDLC SERPL: 1.2 {RATIO} (ref ?–4.4)
PROT SPEC-MCNC: 6.2 G/DL (ref 6.7–8.2)
SODIUM SERPLBLD-SCNC: 141 MMOL/L (ref 135–145)
VLDLC SERPL-SCNC: 10 MG/DL

## 2020-02-28 PROCEDURE — 36415 COLL VENOUS BLD VENIPUNCTURE: CPT

## 2020-02-28 PROCEDURE — 83036 HEMOGLOBIN GLYCOSYLATED A1C: CPT

## 2020-02-28 PROCEDURE — 80061 LIPID PANEL: CPT

## 2020-02-28 PROCEDURE — 83721 ASSAY OF BLOOD LIPOPROTEIN: CPT

## 2020-02-28 PROCEDURE — 80053 COMPREHEN METABOLIC PANEL: CPT

## 2020-02-28 NOTE — ULTRASOUND REPORT
Reason:  DIARRHEA,CHRONIC

Procedure Date:  02/27/2020   

Accession Number:  [id number] / [id number]                    

Procedure:  US  - Abdomen Complete CPT Code:  

 

***Final Report***

 

 

FULL RESULT:

 

 

EXAM:

ABDOMEN ULTRASOUND

 

EXAM DATE: 2/27/2020 10:25 AM.

 

CLINICAL HISTORY: DIARRHEA, CHRONIC.

 

COMPARISON: ABDOMEN/PELVIS W/ 06/20/2014 10:53 AM.

 

TECHNIQUE: Real-time scanning was performed with static images obtained.

 

FINDINGS:

Liver: Hyperechoic lesion in the left hepatic lobe measures 2.8 x 1.2 x 

1.5 cm, without internal color signal. Anechoic cyst adjacent to the 

undersurface of the left hepatic lobe, measures 5 x 6 x 8 mm. 10.4 cm. 

Main portal vein flow: Hepatopetal.

 

Gallbladder: Normal. No stones, wall thickening, or sonographic Kramer's 

sign.

 

Biliary System: Common bile duct measures 7 mm. No intrahepatic or 

extrahepatic ductal dilatation.

 

Pancreas: Hypoechoic nodule in the pancreatic neck, measures 7 x 6 x 8 

mm, corresponds to fat on CT dated 06/20/2014, possible small lipoma or 

interdigitating fat

 

Kidneys:

Right: 9.7 cm longitudinally. Normal. No contour-deforming mass, stones, 

or hydronephrosis.

Left: 9.5 cm longitudinally. Normal. No contour-deforming mass, stones, 

or hydronephrosis.

 

Spleen: 9.5 x 3.4 x 4.2 cm for a volume of 72 mL. Calcifications in the 

spleen, consistent with calcified granulomas, most commonly in the 

setting of prior granulomatous infection

 

Aorta and Inferior Vena Cava: Aortic atherosclerotic disease present

 

Other: None.

IMPRESSION:

1. Hyperechoic lesion in the left hepatic lobe is indeterminate, not 

previously seen. Could be focal fat or hemangioma though is incompletely 

characterized. Dedicated liver MRI could fully characterize this lesion.

2. Small hypoechoic nodule in the pancreatic neck, corresponds to small 

lipoma or interdigitating fat on CT.

3. Calcified splenic granulomas, commonly seen in the setting of prior 

granulomatous infection

 

[facility]

## 2020-05-29 ENCOUNTER — HOSPITAL ENCOUNTER (OUTPATIENT)
Dept: HOSPITAL 76 - LAB.WCP | Age: 85
Discharge: HOME | End: 2020-05-29
Attending: PHYSICIAN ASSISTANT
Payer: MEDICARE

## 2020-05-29 DIAGNOSIS — E11.9: Primary | ICD-10-CM

## 2020-05-29 LAB
ANION GAP SERPL CALCULATED.4IONS-SCNC: 5 MMOL/L (ref 6–13)
BUN SERPL-MCNC: 17 MG/DL (ref 6–20)
CALCIUM UR-MCNC: 9.3 MG/DL (ref 8.5–10.3)
CHLORIDE SERPL-SCNC: 107 MMOL/L (ref 101–111)
CO2 SERPL-SCNC: 28 MMOL/L (ref 21–32)
CREAT SERPLBLD-SCNC: 0.8 MG/DL (ref 0.4–1)
EST. AVERAGE GLUCOSE BLD GHB EST-MCNC: 148 MG/DL (ref 70–100)
GLUCOSE SERPL-MCNC: 127 MG/DL (ref 70–100)
HB2 TOTAL: 14.2 G/DL
HBA1C BLD-MCNC: 0.72 G/DL
HEMOGLOBIN A1C %: 6.8 % (ref 4.6–6.2)
SODIUM SERPLBLD-SCNC: 140 MMOL/L (ref 135–145)

## 2020-05-29 PROCEDURE — 80048 BASIC METABOLIC PNL TOTAL CA: CPT

## 2020-05-29 PROCEDURE — 83036 HEMOGLOBIN GLYCOSYLATED A1C: CPT

## 2020-05-29 PROCEDURE — 36415 COLL VENOUS BLD VENIPUNCTURE: CPT

## 2020-11-12 ENCOUNTER — HOSPITAL ENCOUNTER (OUTPATIENT)
Dept: HOSPITAL 76 - DI | Age: 85
Discharge: HOME | End: 2020-11-12
Attending: PHYSICIAN ASSISTANT
Payer: MEDICARE

## 2020-11-12 DIAGNOSIS — E78.5: ICD-10-CM

## 2020-11-12 DIAGNOSIS — I25.10: Primary | ICD-10-CM

## 2020-11-12 DIAGNOSIS — Z82.49: ICD-10-CM

## 2020-11-12 DIAGNOSIS — Z95.5: ICD-10-CM

## 2020-11-12 DIAGNOSIS — I10: ICD-10-CM

## 2020-11-12 PROCEDURE — 93017 CV STRESS TEST TRACING ONLY: CPT

## 2020-11-12 PROCEDURE — 93018 CV STRESS TEST I&R ONLY: CPT

## 2020-11-12 PROCEDURE — 93016 CV STRESS TEST SUPVJ ONLY: CPT

## 2020-11-12 PROCEDURE — 78452 HT MUSCLE IMAGE SPECT MULT: CPT

## 2020-11-12 NOTE — CARDIAC PROCEDURE NOTE
DATE OF SERVICE: 11/12/2020

Physician: Sunni Reed MD, [facility]

 

INDICATION:  Coronary artery disease (CAD).

 

CARDIAC RISK FACTORS:  Advanced age, hypertension, hyperlipidemia, and family 
history of heart disease.  The patient has known coronary artery disease with 
coronary stents in her past history.

 

DESCRIPTION OF PROCEDURE:  After signing informed consent, the patient underwent
a modified Car protocol treadmill stress test with nuclear myocardial 
perfusion imaging.

 

RESTING HEART RATE: 76.    PEAK HEART RATE: 122 (91% predicted maximum heart 
rate for age).

 

RESTING BLOOD PRESSURE: 168/82.   PEAK BLOOD PRESSURE: 214/79.

 

The patient exercised for 4 minutes on a modified Car protocol that was 
entirely in stage I.  The patient developed "heartburn" with exertion and had 
mild shortness of breath.  She rated her perceived exertion at 17/20 on the Mary
scale at peak.  Oxygen saturation was 98-99% throughout the test on room air.  
During exercise, rare PVCs became more frequent, there were 2 ventricular 
couplets and one ventricular triplet (multifocal).

 

RESTING EKG:  Atrial fibrillation, rare PVC, inferolateral scooping ST segments.

 

EKG AT PEAK:  More frequent PVCs, ventricular couplets and one ventricular 
triplet was seen, no new ST segment or T-wave changes from the baseline 
abnormality.

 

SUMMARY: 

1.  Abnormal resting EKG.

2.  Cannot comment on ST segment changes due to baseline ST segment 
abnormalities present.

3.  Increased ventricular ectopy during exercise.

4.  New symptom of "heartburn" and mild shortness of breath occurred with 
exercise.

5.  Poor exercise tolerance.

6.  Poor blood pressure control.

7.  Nuclear images showed no fixed or reversible perfusion defects, and normal 
LV systolic function.

 

IMPRESSION:

Normal stress test.



RECOMMENDATIONS:

Better BP control.

Please note that the patient exhibits signs of dementia and she was driving 
alone to this test, which should be prohibited.





cc: Daja Sargent PA-C

DD: 11/12/2020 15:30

TD: 11/12/2020 15:36

Job #: [id number]

[facility] Center[facility]ITO

## 2020-11-13 NOTE — NUCLEAR MEDICINE REPORT
PROCEDURE:  

Rest and exercise myocardial perfusion SPECT with gated imaging and ejection fraction

 

INDICATIONS:  CAD

 

RADIOPHARMACEUTICAL:  8.6 mCi Tc-99m Myoview IV at rest and 25.8 mCi Tc-99m Myoview IV at peak exerci
se.  One-day-protocol was performed.  

 

TECHNIQUE:  Radiopharmaceutical was injected at peak stress test, and also at rest.  SPECT images wer
e obtained.  SPECT myocardial perfusion images were displayed in short axis, horizontal long axis, an
d vertical long axis views.  Gated images were reviewed using Auto[employer] software.  

 

COMPARISON:   None available.

 

FINDINGS:  

 

Raw data:  There is good myocardial labeling by radiotracer.  No significant motion artifacts.  Lung-
to-heart ratio is 0.35 (normal is less than 0.38 for tetrafosmin tracer).  

 

Left ventricle function:  Gated images demonstrate normal left ventricle wall thickening.  No segment
al wall motion abnormality.  No transient ischemic dilation; TID is 1.27 (normal less than 1.3).  The
 left ventricle resting end-diastolic volume is normal.  Left ventricle stress ejection fraction is >
 70%; normal values are above 45%.  

 

Myocardial perfusion:  There is normal distribution of activity in the left and right ventricular adeola
cardium.  No fixed or reversible perfusion defects.

 

IMPRESSION:  

1. Normal myocardial perfusion images.

2. Normal left ventricular volume and systolic function.

3. Please correlate with stress ECG report.

 

PQRS ATTESTATIONS:  

Measure 322 - Is this imaging test primarily performed on a low-risk surgery patient for preoperative
 evaluation within 30 days preceding their low-risk non-cardiac surgery?  Low-risk surgery is defined
 as cardiac death or myocardial infarction less than 1%, including (but not limited to) endoscopic pr
ocedures, superficial procedures, cataract surgery, and excisional breast surgery:  Answer:  No

Measure 323 - Is this imaging test performed primarily for the monitoring of an asymptomatic patient 
who had percutaneous coronary intervention on the visit date or within 2 years of the visit date?  An
swer:  No

Measure 324 - Is this imaging test performed primarily for the initial detection and risk assessment 
on an asymptomatic, low coronary heart disease patient?  Low CHD risk definition = clinicians should 
consider the maximum number of available patient factors used to estimate risk based on Fifield (A
TP III criteria), typically age, gender, diabetes, smoking status, and use of blood pressure medicati
on, and integrate age appropriate estimates for missing elements, such as LDL or standard blood press
ure.  Answer:  No

 

Reviewed by: Jenifer Lockwood MD on 11/13/2020 1:43 PM PST

Approved by: Jenifer Lockwood MD on 11/13/2020 1:43 PM PST

 

 

Station ID:  [id number]

## 2021-01-27 ENCOUNTER — HOSPITAL ENCOUNTER (OUTPATIENT)
Dept: HOSPITAL 76 - DI | Age: 86
Discharge: HOME | End: 2021-01-27
Attending: PHYSICIAN ASSISTANT
Payer: MEDICARE

## 2021-01-27 DIAGNOSIS — I25.10: ICD-10-CM

## 2021-01-27 DIAGNOSIS — I67.82: ICD-10-CM

## 2021-01-27 DIAGNOSIS — R41.3: Primary | ICD-10-CM

## 2021-01-27 NOTE — CT REPORT
PROCEDURE:  HEAD WO

 

INDICATIONS:  MEMORY LOSS

 

TECHNIQUE:  

Noncontrast 4.5 mm thick angled axial sections acquired from the foramen magnum to the vertex.  For r
adiation dose reduction, the following was used:  automated exposure control, adjustment of mA and/or
 kV according to patient size.

 

COMPARISON:  None.

 

FINDINGS:  

Image quality:  Excellent.  

 

CSF spaces:  Basal cisterns are patent.  No extra-axial fluid collections.  Ventricles are normal in 
size and shape.  

 

Brain:  No midline shift.  No intracranial masses or hemorrhage.  Gray-white matter interface is norm
al.  Age-related volume loss and mild small vessel ischemic change. Carotid and vertebral artery calc
ifications.

 

Skull and face:  Calvarium and visualized facial bones are intact, without suspicious lesions.  

 

Sinuses:  Visualized sinuses and mastoids are clear.  

 

IMPRESSION:  

1. Age-related volume loss and mild small vessel ischemic change.

2. No evidence acute stroke, hemorrhage, or mass.

3. ASCVD.

 

Reviewed by: Esdras Graff MD on 1/27/2021 3:24 PM PST

Approved by: Esdras Graff MD on 1/27/2021 3:24 PM PST

 

 

Station ID:  [id number]

## 2021-02-12 ENCOUNTER — HOSPITAL ENCOUNTER (OUTPATIENT)
Dept: HOSPITAL 76 - LAB.WCP | Age: 86
Discharge: HOME | End: 2021-02-12
Attending: PHYSICIAN ASSISTANT
Payer: MEDICARE

## 2021-02-12 DIAGNOSIS — E11.9: ICD-10-CM

## 2021-02-12 DIAGNOSIS — I25.10: Primary | ICD-10-CM

## 2021-02-12 DIAGNOSIS — K21.9: ICD-10-CM

## 2021-02-12 LAB
ALBUMIN DIAFP-MCNC: 4.1 G/DL (ref 3.2–5.5)
ALBUMIN/GLOB SERPL: 1.4 {RATIO} (ref 1–2.2)
ALP SERPL-CCNC: 82 IU/L (ref 42–121)
ALT SERPL W P-5'-P-CCNC: 35 IU/L (ref 10–60)
ANION GAP SERPL CALCULATED.4IONS-SCNC: 10 MMOL/L (ref 6–13)
AST SERPL W P-5'-P-CCNC: 25 IU/L (ref 10–42)
BASOPHILS NFR BLD AUTO: 0.1 10^3/UL (ref 0–0.1)
BASOPHILS NFR BLD AUTO: 1 %
BILIRUB BLD-MCNC: 1.3 MG/DL (ref 0.2–1)
BUN SERPL-MCNC: 16 MG/DL (ref 6–20)
CALCIUM UR-MCNC: 9.4 MG/DL (ref 8.5–10.3)
CHLORIDE SERPL-SCNC: 103 MMOL/L (ref 101–111)
CHOLEST SERPL-MCNC: 164 MG/DL
CO2 SERPL-SCNC: 27 MMOL/L (ref 21–32)
CREAT SERPLBLD-SCNC: 0.8 MG/DL (ref 0.4–1)
EOSINOPHIL # BLD AUTO: 0.1 10^3/UL (ref 0–0.7)
EOSINOPHIL NFR BLD AUTO: 1 %
ERYTHROCYTE [DISTWIDTH] IN BLOOD BY AUTOMATED COUNT: 12 % (ref 12–15)
GLOBULIN SER-MCNC: 3 G/DL (ref 2.1–4.2)
GLUCOSE SERPL-MCNC: 169 MG/DL (ref 70–100)
HBA1C MFR BLD HPLC: 7.4 % (ref 4.27–6.07)
HDLC SERPL-MCNC: 71 MG/DL
HDLC SERPL: 2.3 {RATIO} (ref ?–4.4)
HGB UR QL STRIP: 15 G/DL (ref 12–16)
LDLC SERPL CALC-MCNC: 75 MG/DL
LDLC/HDLC SERPL: 1.1 {RATIO} (ref ?–4.4)
LYMPHOCYTES # SPEC AUTO: 2.4 10^3/UL (ref 1.5–3.5)
LYMPHOCYTES NFR BLD AUTO: 33.1 %
MCH RBC QN AUTO: 29.8 PG (ref 27–31)
MCHC RBC AUTO-ENTMCNC: 33.1 G/DL (ref 32–36)
MCV RBC AUTO: 90.1 FL (ref 81–99)
MONOCYTES # BLD AUTO: 0.6 10^3/UL (ref 0–1)
MONOCYTES NFR BLD AUTO: 8.1 %
NEUTROPHILS # BLD AUTO: 4 10^3/UL (ref 1.5–6.6)
NEUTROPHILS # SNV AUTO: 7.1 X10^3/UL (ref 4.8–10.8)
NEUTROPHILS NFR BLD AUTO: 56.7 %
PDW BLD AUTO: 10.1 FL (ref 7.9–10.8)
PLATELET # BLD: 255 10^3/UL (ref 130–450)
PROT SPEC-MCNC: 7.1 G/DL (ref 6.7–8.2)
RBC MAR: 5.03 10^6/UL (ref 4.2–5.4)
VLDLC SERPL-SCNC: 18 MG/DL

## 2021-02-12 PROCEDURE — 83036 HEMOGLOBIN GLYCOSYLATED A1C: CPT

## 2021-02-12 PROCEDURE — 85025 COMPLETE CBC W/AUTO DIFF WBC: CPT

## 2021-02-12 PROCEDURE — 80053 COMPREHEN METABOLIC PANEL: CPT

## 2021-02-12 PROCEDURE — 80061 LIPID PANEL: CPT

## 2021-02-12 PROCEDURE — 83721 ASSAY OF BLOOD LIPOPROTEIN: CPT

## 2021-02-12 PROCEDURE — 36415 COLL VENOUS BLD VENIPUNCTURE: CPT

## 2021-03-01 ENCOUNTER — HOSPITAL ENCOUNTER (OUTPATIENT)
Dept: HOSPITAL 76 - LAB.WCP | Age: 86
Discharge: HOME | End: 2021-03-01
Attending: PHYSICIAN ASSISTANT
Payer: MEDICARE

## 2021-03-01 DIAGNOSIS — K52.9: Primary | ICD-10-CM

## 2021-03-01 PROCEDURE — 36415 COLL VENOUS BLD VENIPUNCTURE: CPT

## 2021-03-01 PROCEDURE — 81599 UNLISTED MAAA: CPT

## 2021-03-01 PROCEDURE — 83516 IMMUNOASSAY NONANTIBODY: CPT

## 2021-03-01 PROCEDURE — 86255 FLUORESCENT ANTIBODY SCREEN: CPT

## 2021-04-06 ENCOUNTER — HOSPITAL ENCOUNTER (OUTPATIENT)
Age: 86
Discharge: HOME | End: 2021-04-06
Payer: MEDICARE

## 2021-04-06 PROCEDURE — 99345 HOME/RES VST NEW HIGH MDM 75: CPT

## 2021-04-06 NOTE — CONSULTATION NOTE
Palliative Care Consultation





- Referral


Referring Provider: Daja Sargent PA-C


Time of Visit: 1598-4036


Referral setting: Home


Referral Reason: Dementia without behavioral disturbances/Atrial Fib/Pal Care





- Information Sources


Records reviewed: Previous records reviewed


History/Review of Systems obtained from: Patient, Family (daughter Randi and 

Thao present)


Exam limitations: Clinical condition (patient with significant STM issues)





- History of Present Illness


Brief History of Present Illness: 


This is an 86-year-old woman who presents with worsening memory problems, most 

likely exacerbated both by depression and her current social situation.  In 

reflection with family, patient's memory issues have most likely been present 

for 3 or 4 years, but have come to light more acutely as 's health has 

continued to deteriorate over last several months, he is currently on hospice, 

and family helping to manage current situation.  As most, patient and her 

 have been isolated from family given Covid pandemic, from March to 

October, as the situation had worsened, until 's health became a crisis.





Patient does have slight insight into her memory issues, they are looking at 

placement for her, as expect him to pass in the next few weeks.  He has been 

doing the cooking, managing the house and finances, she is able to feed herself,

ambulate, noted concern regarding changes in bathing practices, as well as 

toileting.  She is oriented to place, she admits only being oriented to time 

given she has her reminder clock and it has been difficult managing her meds. 

She has some recall of long-term events, much less recall of short term. Family 

are calling and reminding her for medications and using technical support with 

the med dispenser.  Patient does not present with ability to live independently,

does need cueing and reminding for most ADLs, she would be a good candidate for 

assisted living. She currently lives in a very large house, would not be able to

manage long-term, high risk for falls as is multilevel.They are currently 

narrowed down to to assisted living facilities, and are requesting advice 

regarding transition points.





Patient also presents with persistent feelings of depression, admits to being 

overwhelmed with her current situation and thought of leaving her home, family 

note that she took a turn for the worse with the death of her son 4 years ago.  

She was started on sertraline in January, with some improvement, but does admit 

when she is overwhelmed, just likes to go to sleep.  She has been sleeping more,

more withdrawn, family been present trying to deal with the current crisis with 

her  on hospice, and have noted her fairly quick decline over the last 

several months.  Patient presents on the FAS T scale as a 6A.








Medical/Surgical History





- Past Medical History


Cardiovascular: reports: Hypertension, Coronary artery disease, Atrial 

fibrillation


Respiratory: reports: None


Neuro: Dementia


Endocrine/Autoimmune: reports: Type 2 diabetes


GI: reports: GERD, Chronic diarrhea


: reports: Incontinence, Frequency


HEENT: reports: Macular degeneration


Psych: reports: Depression


Musculoskeletal: reports: Osteoarthritis, Osteoporosis, Fatigue, Chronic back 

pain


Derm: reports: None


MRSA Hx?: No





- Past Surgical History


General: reports: Colonoscopy


Ortho: reports: Other


/GYN: reports: Hysterectomy


Cardiovascular: reports: Coronary stent


HEENT: reports: Cataracts





Social History





- Living Situation


Living arrangement: At home


Living Situation: With spouse/s.o.


Support System: 


Patient lives with her  of 61 years, he is currently on hospice, with 

expected rapid decline.  He was in the , they have lived on [facility] for 20 years.  Have a very large older home with multiple levels, he has 

been doing the cooking and oversight for several years now.  Patient has 2 

daughters Thao and Randi, unfortunately now faced with a crisis.  Patient will 

need to be placed, looking at assisted living, with the reality to transition to

memory care as her disease progresses.








Family History





- Family History


Family History: Mother: , CAD, MI (son  at 54 MI), Father: ,

Other family: , MI





Medications/Allergies





- Medications


Home Medications: 


                                Ambulatory Orders











 Medication  Instructions  Recorded  Confirmed


 


Multivit-Min/FA/Lycopen/Lutein 1 each ORAL DAILY 07/15/15 04/07/21





[Centrum Silver Tablet]   


 


Rivaroxaban [Xarelto] 20 mg ORAL DAILY 07/15/15 04/07/21


 


Omeprazole [PriLOSEC] 20 mg PO DAILY 02/15/16 04/07/21


 


Lisinopril [Zestril] 2.5 mg PO DAILY 21


 


Metoprolol Succinate [Toprol Xl] 25 mg PO DAILY 21


 


Sertraline [Zoloft] 25 mg PO DAILY 21














- Allergies


Allergies/Adverse Reactions: 


                                    Allergies











Allergy/AdvReac Type Severity Reaction Status Date / Time


 


No Known Drug Allergies Allergy   Verified 19 11:20














Review of Systems





- Constitutional


Constitutional: reports: Fatigue, Weight stable.  denies: Fever, Chills





- Eyes


Eyes: reports: Vision loss





- Ears, Nose & Throat


Ears, Nose & Throat: reports: Hearing loss, Hearing aids (doesn't wear)





- Cardiovascular


Cardiovascular: reports: Exertional dyspnea.  denies: Chest pain, Edema





- Respiratory


Respiratory: denies: Cough, SOB at rest





- Gastrointestinal


Gastrointestinal: reports: Diarrhea.  denies: Abdominal pain





- Genitourinary


Genitourinary: reports: Incontinence





- Musculoskeletal


Musculoskeletal: reports: Muscle aches, Stiffness, Muscle weakness





- Integumentary


Integumentary: reports: Dryness





- Neurological


Neurological: reports: General weakness, Memory problems





- Psychiatric


Psychiatric: reports: Depression, Anxiety





- Hematologic/Lymphatic


Hematologic/Lymph: denies: Recurrent infections





- All Other Systems


All Other Systems: reports: Other (defers to daughters)





Physical Exam





- Vital Signs


Temperature: 97.2 C


Pulse Rate: 72


Respiratory Rate: 18


O2 Saturation: 98 (ra@ rest)


Blood Pressure: 122/72





- Physical Exam


General Appearance: positive: No acute distress, Alert, Anxious


Eyes Bilateral: positive: Normal inspection


ENT: positive: No signs of dehydration


Neck: positive: Trachea midline


Cardiovascular: positive: Irregularly irregular


Respiratory: positive: No respiratory distress, Breath sounds nml


Abdomen: positive: Non-tender, Soft, Nml bowel sounds


Skin: positive: Pallor, Dryness


Extremities: positive: No pedal edema


Neurologic/Psychiatric: positive: Oriented x3, Depressed mood/affect, Flat 

affect





Palliative Care





- POLST


Patient has POLST: No


Pain: Pain unchanged, Location (knees; left greater than right)


Tiredness/Fatigue: Moderate (4-6)


Drowsiness/Sedation: Moderate (4-6) (sleeps when feeling overwhelmed)


Nausea: None


Anorexia: Mild (1-3)


Dyspnea: None


Depression: Moderate (4-6)


Anxiety: Moderate (4-6)


Feelings of wellbeing/Perceived Quality of Life: Fair, Worsening


Sleep: Sleeps well


Constipation: No


Performance Status: 


Patient is slow and purposeful in her ambulation, she does have a slight 

shuffling gait. She has been independently showering, the concern about her 

being able to pick out clothing, and dress. Patient is not managing any IADLs, 

and would not most likely be able to do own food prep. Noted she is able to 

engage in conversation, poor short-term memory, does miss her coffee gatherings 

with her friends.








- Palliative Care


Discussion: 


Unfortunately I am meeting patient in the midst of crisis, they are looking at 

placement for her given her  is on hospice and expected to die the next 

couple weeks. My understanding of the relationship is quite complicated, and 

have lived somewhat parallel lives especially the last few years. Daughter's 

perception is patient is ambivalent about being present for his end-of-life, and

 wondering about placing her prior to his death. Patient of course would like to

 stay in her own home, but does not have any insight into the complexities of 

being able to manage independently, and does understand the concern for her to 

be there by herself by her family.





Recommended given patient's level of dementia, safety concerns, would recommend 

placement at assisted living. They are looking specifically for something that 

has a graduated tier, including being able to transition over to memory care. I 

am in agreement with this, there weighing between [facility] assisted living, which 

does have a few of her friends nearby, versus [facility] closer to her sister 

and family. Recommend they look at it in the context of socialization, as this 

most likely would be most supportive of patient given her most recent isolation,

 expressed interest of spending time with friends and family, and ability to 

transition. As far as a question about whether prior to his death or not, would 

recommend secure housing, and transition into new setting if possible before his

 death, she can always return and spend time in the situation if that is her 

wish.





Did explore patient's current's concerns and anxieties, patient has difficulty 

processing the complexity of her current situation. She is quite trusting of her

 family, and has resigned herself to transition. Reinforced and supported this 

transition, acknowledging the difficulty it was for her emotionally. We also 

discussed some the grief and loss she has felt with the loss of her son, and 

just the changes she is experienced through her aging process.








Impression and Recommendations





- Palliative Care


Impression: 


This is an 86-year-old woman who presents with moderate dementia, most likely 

mixed vascular Alzheimer's. She has had steady decline, and reflection, most 

likely has had progressive changes over the last 4 years. Unfortunately she 

presents in a complex situation, needing placement given the expected decline 

with her  on hospice. Patient also has depression, atrial fib, but 

otherwise is quite functional and able to engage in conversation. She would do 

well in an assisted living situation. Palliative care providing support her 

decisions for transition, advanced care planning, and anticipatory guidance.





Recommendations/Counseling Done: 


1. Depression. Patient on very low-dose sertraline 25 mg, patient most likely be

 able to tolerate and improve on higher dosing, will recommend increase after 

patient is settled in new setting. Patient may improve just from increased 

socialization, after initial adjustment. We will continue to monitor at this 

point in time. Provided time to normalize counseling regarding grief and loss, 

even at this stage.





2. Dementia without behavioral disturbances. Patient presents with most likely 

mixed Alzheimer's/vascular. She has had steady decline, more acutely with social

 isolation and current complex social situation. Patient's original MMSE in 

January was 20. Given the current chaos in crisis, will defer further formal 

testing until settled in new setting. Daughter is aware of natural progression 

of disease, though most likely could use more support and anticipatory guidance 

in the future.





3. Generalized weakness. Patient has been quite sedentary, has been active in 

the past. She may actually benefit from physical therapy support in her new 

setting, or if they have active seniors program. Patient has always been quite 

active most of her life, this would be of benefit to continue to support her 

overall health. Reviewed current set up for house, very high risk for falls 

given multiple levels, stairs, and patient's functional decline. Recommendations

 made, but patient most likely should be on one-story floor for safety.





4. Advanced care planning. Daughters are working on getting a DPOA, have 

appointment this afternoon, as they are trying to finalize both financial and 

legal documents to be able to support both her and her  as they are in 

this complex situation. They have been quite resistant up to this point in time 

and planning for their end-of-life. Daughter feels patient is quite resistant 

and fearful of death and dying, patient most likely is going to need a POLST in 

her transition to a new setting. Did offer to return and have a sit down 

conversation with patient, regarding goals and completion of this. Palliative 

care role explained, spent time setting up rapport with patient, will continue 

to follow and less [place].





75 minutes with greater than 50% of this done in counseling regarding goals of 

care, safety, role of palliative care, and anticipatory guidance with 

recommendation for assisted living placement.

## 2021-05-14 ENCOUNTER — HOSPITAL ENCOUNTER (OUTPATIENT)
Dept: HOSPITAL 76 - LAB.WCP | Age: 86
Discharge: HOME | End: 2021-05-14
Attending: PHYSICIAN ASSISTANT
Payer: MEDICARE

## 2021-05-14 DIAGNOSIS — E11.9: Primary | ICD-10-CM

## 2021-05-14 DIAGNOSIS — R41.3: ICD-10-CM

## 2021-05-14 LAB
ANION GAP SERPL CALCULATED.4IONS-SCNC: 7 MMOL/L (ref 6–13)
BUN SERPL-MCNC: 18 MG/DL (ref 6–20)
CALCIUM UR-MCNC: 9.3 MG/DL (ref 8.5–10.3)
CHLORIDE SERPL-SCNC: 107 MMOL/L (ref 101–111)
CO2 SERPL-SCNC: 27 MMOL/L (ref 21–32)
CREAT SERPLBLD-SCNC: 0.9 MG/DL (ref 0.4–1)
CREAT UR-SCNC: 131.8 MG/DL
EST. AVERAGE GLUCOSE BLD GHB EST-MCNC: 163 MG/DL (ref 70–100)
GFRSERPLBLD MDRD-ARVRAT: 59 ML/MIN/{1.73_M2} (ref 89–?)
GLUCOSE SERPL-MCNC: 127 MG/DL (ref 70–100)
HBA1C MFR BLD HPLC: 7.3 % (ref 4.27–6.07)
MICROALBUMIN UR-MCNC: 0.8 MG/DL (ref 0–300)
MICROALBUMIN/CREAT RATIO PNL UR: 6.1 UG/MG (ref ?–30)
POTASSIUM SERPL-SCNC: 4.1 MMOL/L (ref 3.5–5)
SODIUM SERPLBLD-SCNC: 141 MMOL/L (ref 135–145)

## 2021-05-14 PROCEDURE — 83036 HEMOGLOBIN GLYCOSYLATED A1C: CPT

## 2021-05-14 PROCEDURE — 80048 BASIC METABOLIC PNL TOTAL CA: CPT

## 2021-05-14 PROCEDURE — 82570 ASSAY OF URINE CREATININE: CPT

## 2021-05-14 PROCEDURE — 36415 COLL VENOUS BLD VENIPUNCTURE: CPT

## 2021-05-14 PROCEDURE — 82607 VITAMIN B-12: CPT

## 2021-05-14 PROCEDURE — 82043 UR ALBUMIN QUANTITATIVE: CPT

## 2021-07-07 ENCOUNTER — HOSPITAL ENCOUNTER (OUTPATIENT)
Dept: HOSPITAL 76 - PC | Age: 86
Discharge: HOME | End: 2021-07-07
Attending: NURSE PRACTITIONER
Payer: MEDICARE

## 2021-07-07 DIAGNOSIS — I10: ICD-10-CM

## 2021-07-07 DIAGNOSIS — F32.9: ICD-10-CM

## 2021-07-07 DIAGNOSIS — Z66: ICD-10-CM

## 2021-07-07 DIAGNOSIS — Z51.5: Primary | ICD-10-CM

## 2021-07-07 DIAGNOSIS — Z79.01: ICD-10-CM

## 2021-07-07 DIAGNOSIS — F03.91: ICD-10-CM

## 2021-07-07 DIAGNOSIS — Z91.83: ICD-10-CM

## 2021-07-07 DIAGNOSIS — I48.91: ICD-10-CM
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
## 2021-07-22 ENCOUNTER — HOSPITAL ENCOUNTER (OUTPATIENT)
Dept: HOSPITAL 76 - PC | Age: 86
Discharge: HOME | End: 2021-07-22
Attending: NURSE PRACTITIONER
Payer: MEDICARE

## 2021-07-22 DIAGNOSIS — F03.91: ICD-10-CM

## 2021-07-22 DIAGNOSIS — Z66: ICD-10-CM

## 2021-07-22 DIAGNOSIS — Z91.83: ICD-10-CM

## 2021-07-22 DIAGNOSIS — I48.91: ICD-10-CM

## 2021-07-22 DIAGNOSIS — Z51.5: Primary | ICD-10-CM

## 2021-07-22 DIAGNOSIS — Z79.01: ICD-10-CM

## 2021-07-22 DIAGNOSIS — I10: ICD-10-CM

## 2021-07-22 NOTE — CONSULTATION NOTE
Palliative Care Follow Up





- Referral


Referring Provider: Daja Sargent PA-C


Time of Visit: 3123-2458


Referral setting: Assisted living


Referral Reason: Dementia with behavioral disturbances/Pal Care





- Information Sources


Records reviewed: Previous records reviewed


History/Review of Systems obtained from: Patient, Family (spoke with daughter 

Thao), Nursing (staff at facility)


Exam limitations: Clinical condition (patient with advanced dementia)





- History of Present Illness Update


Brief HPI Update: 


This is a 86-year-old woman who presents with dementia with behavioral 

disturbances, now placed in memory care. She has had worsening memory issues, 

depression, and significant decline specifically over the last few weeks to 

months. This is thought to originally be exacerbated by the loss of her son, 

then transition of her  to hospice. The family originally placed here in 

[facility] assisted living, unfortunately she was found wandering and unable to 

find her way back, given her safety issues and deterioration of mental status 

she was transition to memory care. She actually has done fairly well here, is 

not oriented to place, but is not at all distressed with her current situation. 

On approach today, she is able to engage in social conversation, though she is 

disoriented to time place and person. She does report to me her   

yesterday, when tried to redirect, she was still staying with the story. She 

reports she was somewhat sad, but then went on to discuss her distress with her 

relationship with him reflecting that he was but basically an angry person, 

though he was a good [employer] man.





She has very little insight into her dementia, though she does report "not very 

sharp right now". She is having some delusions about her situation, but denies 

being afraid or worried, reports people are nice and friendly, she denies 

trouble sleeping, and food is been good. Her weight has remained stable.





Past Medical History: 


Hypertension, CAD, atrial fib, dementia, type 2 diabetes, GERD, chronic 

diarrhea, incontinence, macular degeneration, depression, osteoarthritis, 

osteoporosis, fatigue, chronic back pain, "abdominal polyps".








Social History





- Living Situation


Living arrangement: Assisted living (Memory Care)


Support System: 


Patient continues to reflect on her very difficult marriage relationship with 

her , he is currently on hospice though today she thinks he  

yesterday. She has a daughter Thao who lives near [place], who has come 

several times to visit as well as a sister and some grandchildren. She has 

another daughter Randi who has not been able to come because of family issues, 

but is planning to come out and see her. Staff are very complementary of her, 

and feels she is adjusting well. She is easy to redirect, and does not seem 

distressed in her current environment.








Medications/Allergies





- Medications


Home Medications: 


                                Ambulatory Orders











 Medication  Instructions  Recorded  Confirmed


 


Rivaroxaban [Xarelto] 20 mg ORAL DAILY 07/15/15 07/22/21


 


Metoprolol Succinate [Toprol Xl] 12.5 mg PO DAILY 21


 


Sertraline [Zoloft] 12.5 mg PO DAILY 21


 


Acetaminophen [Acetaminophen Extra 500 mg PO Q4HR PRN 21





Strength]   


 


Famotidine [Pepcid] 20 mg PO BID 21


 


Loperamide [Imodium] 2 mg PO DAILY PRN 21














- Allergies


Allergies/Adverse Reactions: 


                                    Allergies











Allergy/AdvReac Type Severity Reaction Status Date / Time


 


No Known Drug Allergies Allergy   Verified 19 11:20














Review of Systems





- Constitutional


Constitutional: reports: Fatigue, Weight stable (120.2).  denies: Fever, Chills





- Eyes


Eyes: reports: Vision loss





- Ears, Nose & Throat


Ears, Nose & Throat: reports: Hearing loss, Hearing aids (doesn't wear)





- Cardiovascular


Cardiovascular: reports: Exertional dyspnea.  denies: Chest pain, Edema





- Gastrointestinal


Gastrointestinal: reports: Other (unable to report).  denies: Abdominal pain, 

Diarrhea (denies diarrhea)





- Genitourinary


Genitourinary: reports: Incontinence





- Musculoskeletal


Musculoskeletal: reports: Muscle aches, Stiffness, Muscle weakness





- Integumentary


Integumentary: reports: Dryness





- Neurological


Neurological: reports: General weakness, Memory problems (worsening;)





- Psychiatric


Psychiatric: reports: Depression, Anxiety





- All Other Systems


All Other Systems: reports: Other (limited ROS related to memory)





Physical Exam





- Vital Signs


Temperature: 97.3 C


Pulse Rate: 88


Respiratory Rate: 16


O2 Saturation: 98 (ra @ rest)


Blood Pressure: 118/72





- Physical Exam


General Appearance: positive: No acute distress, Alert


Eyes Bilateral: positive: Normal inspection


Neck: positive: Trachea midline


Cardiovascular: positive: Irregular


Respiratory: positive: No respiratory distress, Breath sounds nml


Abdomen: positive: Non-tender, Soft, Nml bowel sounds


Skin: positive: Pallor, Dryness


Extremities: positive: No pedal edema


Neurologic/Psychiatric: positive: Mood/affect nml, Disoriented to person, 

Disoriented to place, Disoriented to time, Flat affect





Palliative Care





- POLST


Patient has POLST: Yes


POLST Status: DNR, Comfort Measures


Pain: No pain


Feelings of wellbeing/Perceived Quality of Life: Good, Acceptable, Improved (per

 daughters perception)


Sleep: Sleeps well


Performance Status: 


Patient is ambulatory, does need cueing and direction for timing of meals, and 

bathing. She has not had any falls, does appear stable on her feet. We will 

continue to monitor.








- Palliative Care


Discussion: 


Met with patient, she is very friendly and engaged, does not remember me. Though

 seems very comfortable with having conversation, she was reflective of her 

 "who  yesterday" though he has not, reports she was sad, but went on

 to explain their very difficult relationship. She is having some delusions, she

 is having word finding problems, there is something about the kitchen, and 

difficulty really identifying who else is part of the setting. She reports 

everybody is nice and friendly, she did remember her daughters had come and set 

up a garden. She wanted to show me this. She showed me her shoes, she likes to 

dress nice, loves complements regarding her outfits. I did not see any distress 

or concerns despite thinking her  had . She does have pictures up in 

her room, was able to identify most of them, and was quite reflective of her 

loss of her son, reporting "should not of happen" as far as losing her kids 

overall.








Impression and Recommendations





- Palliative Care


Impression: 


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 

Alzheimer's/vascular with significant decline, wondering if she had a TIA or 

some neurologic event. She is now in memory care, she does have neuropsychiatric

 behaviors of wandering, but no agitation, she is having delusions, no 

significant anxiety, and does appear actually quite lighter and less depressed. 

Goals remain to focus on comfort focused treatment, avoiding hospitalization and

 end-of-life respectful death without prolongation of suffering. Palliative care

 providing support for patient and family in the context of this transition to 

memory care, monitoring of symptoms, and anticipatory guidance with family.





Recommendations/Counseling Done: 


1. Atrial fib. Patient remains at risk for recurrent TIAs is currently on 

Xarelto. Patient is not having any falls currently, counseling provided 

regarding benefit versus burden in patients with dementia, will continue to 

treat at this point in time and keep her metoprolol at low-dose 12.5 mg.





2. Hypertension. Lisinopril had been discharged continued, patient's blood 

pressure within normal range 118/72, no further issues.





3. Dementia with behavioral disturbances. Patient with mild delusions, does not 

seem to be bothered by them, has not presented with any behavioral issues to 

staff. At this point in time we will continue the sertraline at 12.5 mg, 

discussed this with the daughter given patient's history of depression and 

anxiety will leave at current dosing as she is tolerating without problems. If 

to add something for behaviors or delusions, would look at low-dose quetiapine 

or as needed low-dose Haldol.





4. Advanced care planning. Patient does have POLST with DN AR/DNI and comfort 

focused treatment, patient is doing well in her transition to the memory care 

unit. Daughter and family are feeling better that this is a good fit, and quite 

pleased to see her more happy and engaged. There is lots of grief and sadness 

regarding just the difficulty and hard relationship with her father, but feeling

 like she is in a good place and continue to provide her support.





45 minutes with greater than 50% of this done in counseling regarding 

anticipatory guidance with the daughter, evaluation of patient in current 

setting, coordination of care with care staff.

## 2021-08-05 ENCOUNTER — HOSPITAL ENCOUNTER (OUTPATIENT)
Dept: HOSPITAL 76 - PC | Age: 86
Discharge: HOME | End: 2021-08-05
Attending: NURSE PRACTITIONER
Payer: MEDICARE

## 2021-08-05 DIAGNOSIS — Z66: ICD-10-CM

## 2021-08-05 DIAGNOSIS — L03.114: ICD-10-CM

## 2021-08-05 DIAGNOSIS — Z51.5: Primary | ICD-10-CM

## 2021-08-05 DIAGNOSIS — F03.90: ICD-10-CM

## 2021-08-05 DIAGNOSIS — N39.0: ICD-10-CM

## 2021-08-05 NOTE — CONSULTATION NOTE
Palliative Care Follow Up





- Referral


Referring Provider: Daja Sargent PA-C


Time of Visit: 2723-6941


Referral setting: Assisted living


Referral Reason: Left hand trauma injury/Cellulitis/Contusion left hand/Dementia





- Information Sources


Records reviewed: Previous records reviewed


History/Review of Systems obtained from: Patient, Family (daughter Randi 

present), Nursing (April ABRAN)


Exam limitations: Clinical condition (patient with dementia)





- History of Present Illness Update


Brief HPI Update: 


This is an 86-year-old woman who has had a series of unfortunate events, she is 

a resident of [facility].  She has had worsening memory 

issues, significant decline, both functionally and cognitively over the last few

months.  Most recently  was contacted by staff, concern for increased 

lethargy, confusion, altered from her baseline.  They did do a urine dip with 

pending positive results, patient does have a history of UTIs, though in follow-

up last one treated was .  After discussion with daughter, did start her on 

Bactrim DS 1 tab twice daily with an order for a UA for C&S.





Patient had a fall, as it appears she was leaving the facility, fell on her knee

and most likely broke her fall with her left hand.  Was notified she had an 

abrasion as well as a small skin tear.  Was called by the daughter and concern 

related to worsening confusion yesterday, was speaking "gibberish".  Seemed 

worse and concerned about worsening UTI.  Did arrange for follow-up visit today,

patient is doing much better.  On arrival, she is talking with her daughter 

Randi, does admit to feeling poorly yesterday, but has no short-term memory in 

the context of her fall, or how she has been feeling the last couple days.  She 

denies any dysuria, she does admit to pain in her left hand, on examination does

have significant swelling, small cut over her left thumb, darkened contusion in 

the palm, some warmth and swelling over the thumb base streaking up into left 

wrist.  She does have mobility, it is uncomfortable, though not painful at rest.

 Her right knee does have a foam dressing, dressing is intact denies pain at 

this site.  She does complain of some Pain above her right elbow, though no 

contusion noted nor impacting mobility.  Patient is able to get from sitting to 

standing, is afebrile, does not appear to be in any distress.  Abdomen is soft 

no pain with deep palpation.  Patient is able to form full sentences, is not 

oriented to place, does have some delusions but better than last time I saw her.





Conversation with first Randi and then Thao regarding conservative approach.  

After much discussion given patient's improvement on antibiotics as well as 

concern for cellulitis in that left thumb area, will switch antibiotics to 

cephalexin 500 mg every 8 hours for 7 days, given the worsening contusion and 

bruising will hold Xarelto for a week, will schedule acetaminophen 3 times daily

for 1 week, and if patient tolerates can use ice packs.  After much discussion 

given her treatment plan, would not change if had x-ray.  She is easily 

confused, given her confusion is better, will recommend urgent care if needs to 

leave the facility, otherwise will evaluate more conservative approach.


Past Medical History: 


Hypertension, CAD, atrial fib, dementia, type 2 diabetes, GERD, chronic 

diarrhea, incontinence, macular degeneration, depression, osteoarthritis, 

osteoporosis, fatigue, chronic back pain, "abdominal polyps"








Social History





- Living Situation


Living arrangement: Assisted living (Memory Care)


Support System: 


Patient is newly admitted to memory care, does appear to be having mixed 

response.  For the most part has been settling in, though daughter reports she 

had been packing up and was "bolting" when she had her fall.  She does have a 

daughter Thao who lives there [place], and her daughter Randi for [place] 

is here to see her shortly.  Patient has had a misconception her  has 

, they are currently reporting he is transitioning on hospice.








Medications/Allergies





- Medications


Home Medications: 


                                Ambulatory Orders











 Medication  Instructions  Recorded  Confirmed


 


Rivaroxaban [Xarelto] 20 mg ORAL DAILY MDD HOLD FOR 7 07/15/15 08/05/21





 DAYS  


 


Metoprolol Succinate [Toprol Xl] 12.5 mg PO DAILY 21


 


Sertraline [Zoloft] 12.5 mg PO DAILY 21


 


Acetaminophen [Acetaminophen Extra 1,000 mg PO TID 21





Strength]   


 


Famotidine [Pepcid] 20 mg PO BID 21


 


Loperamide [Imodium] 2 mg PO DAILY PRN 21


 


cephALEXin [Keflex] 500 mg PO TID MDD 7 days 21














- Allergies


Allergies/Adverse Reactions: 


                                    Allergies











Allergy/AdvReac Type Severity Reaction Status Date / Time


 


No Known Drug Allergies Allergy   Verified 19 11:20














Review of Systems





- Constitutional


Constitutional: reports: Fatigue, Weight loss (117).  denies: Fever, Chills





- Eyes


Eyes: reports: Vision loss





- Ears, Nose & Throat


Ears, Nose & Throat: reports: Hearing loss, Hearing aids (doesn't wear)





- Cardiovascular


Cardiovascular: reports: Irregular heart rate, Exertional dyspnea.  denies: 

Chest pain, Edema





- Respiratory


Respiratory: denies: SOB at rest





- Gastrointestinal


Gastrointestinal: reports: Other (unable to report).  denies: Abdominal pain, 

Diarrhea (denies diarrhea)





- Genitourinary


Genitourinary: reports: Incontinence.  denies: Dysuria





- Musculoskeletal


Musculoskeletal: reports: Muscle aches, Stiffness, Muscle weakness





- Integumentary


Integumentary: reports: Dryness, Other (right knee abrasion; left thumb skin 

tear)





- Neurological


Neurological: reports: General weakness, Memory problems (worsening;fluctuating)





- Psychiatric


Psychiatric: reports: Depression, Anxiety





- Endocrine


Endocrine: reports: Diabetes type 2





- All Other Systems


All Other Systems: reports: Other (limited ROS related to memory)





Physical Exam





- Vital Signs


Temperature: 97.2 C


Pulse Rate: 72


Respiratory Rate: 16


O2 Saturation: 97 (ra @ rest)


Blood Pressure: 144/80





- Physical Exam


General Appearance: positive: No acute distress, Alert.  negative: Anxious


Eyes Bilateral: positive: Normal inspection


ENT: positive: No signs of dehydration


Neck: positive: Trachea midline


Cardiovascular: positive: Irregular


Respiratory: positive: No respiratory distress, Breath sounds nml


Abdomen: positive: Non-tender, Soft, Nml bowel sounds


Skin: positive: Pallor, Dryness


Extremities: positive: No pedal edema, Other (see HPI left hand with 

contusion/cellulits)


Neurologic/Psychiatric: positive: Mood/affect nml, Disoriented to person, 

Disoriented to place, Disoriented to time, Flat affect





Palliative Care





- POLST


Patient has POLST: Yes


POLST Status: DNR, Selective Treatment


Pain: Location (left hand worst; right knee)


Feelings of wellbeing/Perceived Quality of Life: Fair, Acceptable


Performance Status: 


Patient is ambulatory, has not had falls prior to this in the facility.  Does 

appear related to her trying to "escape" on her way out the door.  She is able 

to get from sitting to standing, she does have limited use of her left hand now.

  Does need some assistance with dressing but has been able to feed self.








- Palliative Care


Discussion: 


Patient seems quite calm, enjoying her visit with her daughter.  Is not oriented

 to place or time, but is able to carry on social conversation.  Does not seem 

in any distress over her injury, discussed plans in front of her, seems quite 

accepting.  Discussed with both Randi and Thao a plan trying to balance with 

least amount of distress and be conservative as well as responding to her 

current injury and high risk for sequela of further issues regarding this.  

Thao is quite clear wants treatment for things that can be reversed, and would 

consider seeking further aggressive treatment if indicated. But both daughters 

in agreement would not be a surgical candidate and if can manage conservatively 

what they currently want for her. 








Impression and Recommendations





- Palliative Care


Impression: 


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 

Alzheimer's/vascular with continued concern for recurrent TIAs or other 

neurologic events.  Had attributed most recent changes to UTI, had improved some

 on antibiotics but urine was negative with less than 10,000 colonies of bac

teria.  She is doing much better today, as far as alertness, conversational, 

does not appear in any distress.  She does have a left hand trauma injury from 

her fall on , with contusion, possibly fracture, and now presenting with 

symptoms of cellulitis.  Goals remain to focus on comfort focused treatment, 

avoiding hospitalization.  Palliative care making a visit to evaluate and 

consider palliative approach for managing current acute event





Recommendations/Counseling Done: 


1. Left hand traumatic injury.  Patient presents with severe contusion bruising 

and swelling, does have some mobility, but does present with some increased 

redness and heat over the left thumb base, does have a small skin tear above 

this.  This is increased over the last couple days, will go ahead and hold 

Xarelto for a week as suspect this is adding to the hematoma, will treat with 

cephalexin 500 mg 3 times daily for a week to address cellulitis, after much 

discussion weighing benefits and burdens, will defer x-ray for now, and 

encouraged if patient able to tolerate ice for comfort and schedule 

acetaminophen 1000 mg 3 times daily.  If worsens or increased concerns, 

recommended can take him to urgent care clinic for x-ray and further evaluation.





2.  Altered mental status.  This is originally thought possibly related to UTI, 

patient does have a history in the past, has since cleared almost back to 

baseline.  Did get a UA for C&S, shows less than 10,000 bacteria, though 

positive dip.  Will be switching to cephalexin, will cover UTI as well as 

cellulitis.  Patient had been described as "bolting", patient currently is 

distracted and cooperative with staff.  Continue to weigh benefits and burdens 

of adding as needed Zyprexa, or if patient's paranoia and delusions worsen can 

add scheduled small dose at bedtime to help with sleep.





3.  Advanced care planning.  Continue to discuss goals of care, palliative 

approach,  is currently actively dying, patient with very little 

understanding but does come in and out as far as participating in conversations 

regarding his decline.  She does understand he is in hospice, and intermittently

 thinks he is already .





45 minutes with greater than 50% of this done in counseling regarding goals of 

care, management of acute with palliative conservative approach, weighing 

benefits and burdens of interventions, coordination of care with daughters and 

facility staff

## 2021-09-02 ENCOUNTER — HOSPITAL ENCOUNTER (OUTPATIENT)
Dept: HOSPITAL 76 - PC | Age: 86
Discharge: HOME | End: 2021-09-02
Attending: NURSE PRACTITIONER
Payer: MEDICARE

## 2021-09-02 DIAGNOSIS — I10: ICD-10-CM

## 2021-09-02 DIAGNOSIS — F41.9: ICD-10-CM

## 2021-09-02 DIAGNOSIS — Z66: ICD-10-CM

## 2021-09-02 DIAGNOSIS — S00.83XA: ICD-10-CM

## 2021-09-02 DIAGNOSIS — E11.9: ICD-10-CM

## 2021-09-02 DIAGNOSIS — Z91.81: ICD-10-CM

## 2021-09-02 DIAGNOSIS — L84: ICD-10-CM

## 2021-09-02 DIAGNOSIS — I25.10: ICD-10-CM

## 2021-09-02 DIAGNOSIS — F03.91: ICD-10-CM

## 2021-09-02 DIAGNOSIS — I48.91: ICD-10-CM

## 2021-09-02 DIAGNOSIS — M54.9: ICD-10-CM

## 2021-09-02 DIAGNOSIS — Z91.83: ICD-10-CM

## 2021-09-02 DIAGNOSIS — K52.9: ICD-10-CM
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
## 2021-09-02 DIAGNOSIS — H91.90: ICD-10-CM

## 2021-09-02 DIAGNOSIS — K21.9: ICD-10-CM

## 2021-09-02 DIAGNOSIS — R32: ICD-10-CM

## 2021-09-02 DIAGNOSIS — F32.9: ICD-10-CM

## 2021-09-02 DIAGNOSIS — M81.0: ICD-10-CM

## 2021-09-02 DIAGNOSIS — G89.29: ICD-10-CM

## 2021-09-02 DIAGNOSIS — R53.83: ICD-10-CM

## 2021-09-02 DIAGNOSIS — H35.30: ICD-10-CM

## 2021-09-02 DIAGNOSIS — Z51.5: Primary | ICD-10-CM
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
## 2021-09-02 DIAGNOSIS — Z79.01: ICD-10-CM

## 2021-09-02 NOTE — CONSULTATION NOTE
Palliative Care Follow Up





- Referral


Referring Provider: Daja Sargent PA-C


Time of Visit: 1084-6930


Referral setting: Assisted living


Referral Reason: Chin Contusion/Dementia





- Information Sources


Records reviewed: RN notes reviewed, Previous records reviewed


History/Review of Systems obtained from: Patient, Nursing


Exam limitations: Clinical condition (patient with dementia)





- History of Present Illness Update


Brief HPI Update: 


This is a 87-year-old woman who has had a series of unfortunate events, she is a

resident of [facility].  She was placed here as a result of

worsening cognitive decline, mild behavior issues in the context of wandering, 

paranoia, and delusions.  She had been settling in, but unfortunately on , 

fell forward flat on her face, with a significant contusion on her chin.  

Patient has been on Xarelto for her atrial fib, though have been on hold for 

previous fall  as result of severe contusion of her left hand.  This is 

restarted after week, as patient had improved, unclear if patient had had a TIA 

or UTI, was treated with antibiotics clinically as unable to get a adequate 

urine specimen.





Patient's chin is quite tender, bruised and swollen and, pooling occurred down 

into the neck area covering about 40%.  She does have extensive bruising across 

jawline as well though this is fading.  Patient does recall falling flat on her 

face, reports her knees are tender as well, though no bruising noted.





Patient has been working with physical therapy, she does have a shuffling 

somewhat ataxic gait, she moves very quickly.  She seems quite impulsive, her 

daughter reflects she is always been very active and "gets ahead of herself".  

Her cognitive status fluctuates as well as her mood, she does have intermittent 

delusions.  She is not oriented to place, though she knows she is here because 

of falling at home.  She believes her father had just , though after 

partially through the visit, she recognize she was "84 years old" and how could 

her father still be alive.  She then attributed her  had , this is 

been fairly consistent.  She does not seem upset or distressed by this, she is 

quite focused on having to sell the house.


Past Medical History: 


Hypertension, CAD, atrial fib, dementia, type 2 diabetes, GERD, chronic 

diarrhea, incontinence, macular degeneration, depression, osteoarthritis, 

osteoporosis, fatigue, chronic back pain, "abdominal polyps".








Social History





- Living Situation


Living arrangement: Assisted living (Memory Care)


Support System: 


Patient had originally been moved to the assisted living side, but because of 

her worsening memory and neuropsychiatric behaviors, was admitted to memory care

end of July.  Her  is still at home, he is currently on hospice, has had 

a fairly slow decline but is changing currently.  She has a daughter Thao who 

lives in [place], who is trying to coordinate care for both father and mother

in separate settings and her daughter Randi in [place], who has been out 

frequently to provide support and caregiving too








Medications/Allergies





- Medications


Home Medications: 


                                Ambulatory Orders











 Medication  Instructions  Recorded  Confirmed


 


Metoprolol Succinate [Toprol Xl] 12.5 mg PO DAILY 21


 


Sertraline [Zoloft] 12.5 mg PO DAILY 21


 


Acetaminophen [Acetaminophen Extra 500 mg PO Q4HR PRN 21





Strength]   


 


Famotidine [Pepcid] 20 mg PO BID 21


 


Loperamide [Imodium] 2 mg PO DAILY PRN 21














- Allergies


Allergies/Adverse Reactions: 


                                    Allergies











Allergy/AdvReac Type Severity Reaction Status Date / Time


 


No Known Drug Allergies Allergy   Verified 19 11:20














Review of Systems





- Constitutional


Constitutional: reports: Fatigue, Weight stable (120).  denies: Fever, Chills





- Eyes


Eyes: reports: Vision loss





- Ears, Nose & Throat


Ears, Nose & Throat: reports: Hearing loss, Hearing aids (doesn't wear)





- Cardiovascular


Cardiovascular: reports: Irregular heart rate, Exertional dyspnea.  denies: 

Chest pain, Edema





- Respiratory


Respiratory: denies: Cough





- Gastrointestinal


Gastrointestinal: reports: Good appetite.  denies: Abdominal pain, Diarrhea 

(denies diarrhea)





- Genitourinary


Genitourinary: reports: Incontinence.  denies: Dysuria





- Musculoskeletal


Musculoskeletal: reports: Muscle aches, Stiffness, Muscle weakness





- Integumentary


Integumentary: reports: Dryness





- Neurological


Neurological: reports: General weakness, Memory problems (worsening;fluctuating)





- Psychiatric


Psychiatric: reports: Depression, Anxiety, Delusions





- Endocrine


Endocrine: reports: Diabetes type 2





- Hematologic/Lymphatic


Hematologic/Lymph: reports: Bruising





- All Other Systems


All Other Systems: reports: Other (limited ROS related to memory)





Physical Exam





- Vital Signs


Temperature: 97.3 C


Pulse Rate: 62


Respiratory Rate: 18


O2 Saturation: 99 (ra @ rest)


Blood Pressure: 132/72





- Physical Exam


General Appearance: positive: No acute distress, Alert.  negative: Anxious


Eyes Bilateral: positive: Normal inspection


ENT: positive: No signs of dehydration


Neck: positive: Trachea midline


Cardiovascular: positive: Irregular


Respiratory: positive: No respiratory distress, Breath sounds nml


Abdomen: positive: Non-tender, Soft, Nml bowel sounds


Skin: positive: Pallor, Dryness, Bruising (chin see HPI)


Extremities: positive: No pedal edema, Other (left hand healed; no residual 

pain)


Neurologic/Psychiatric: positive: Mood/affect nml, Disoriented to person, 

Disoriented to place, Disoriented to time





Palliative Care





- POLST


Patient has POLST: Yes


POLST Status: DNR, Selective Treatment


Pain: Location (tender chin; feet okay today)


Feelings of wellbeing/Perceived Quality of Life: Fair, Acceptable, Worsening


Performance Status: 


Patient's functional status fluctuates, but is ambulatory.  She is able to self 

feed, and also toilet herself.  She does have "pads" for her intermittent 

incontinence.  She does need some cueing and assistance regarding dressing as 

well as bathing.








- Palliative Care


Discussion: 


Patient is easily engaged in conversation, she does not really recognize ARNP, 

but is comfortable with questions and exam.  She does not seem at all 

distressed, the beginning of visit was talking about her father who just , 

but later recognized she was talking about her .  She reflects that "life

 is changing", she knows she needs to sell the house, but is unclear how all of 

this fits together.  She does not identify as being here as being home, but 

reports that everyone is "real nice".





Spoke with Thao, they continue to struggle with how to include patient in 

's decline.  Reviewed patient does not perceive any distress with talking

 about 's death, but he has been sick a long time, and this was expected.

  Patient remains at significant high risk for falls, have visited this question

 before weighing benefits and burdens of continuing on the Xarelto, patient has 

had significant injuries both times as a result.  Reviewed my recommendation at 

this point in time to discontinue, and daughter in agreement.








Impression and Recommendations





- Palliative Care


Impression: 


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 

Alzheimer's/vascular with continued concern for recurrent TIAs and/or other 

neurologic events.  She has had fluctuating both cognitive and functional 

status, has had another fall  resulting in severe contusion of her chin.  

Given this, with discussion with daughter burdens outweigh benefits of Xarelto 

at this time.  Goals remain to focus on comfort focused treatment and avoiding 

hospitalization.  Palliative care continue provide support for symptom 

management and coordination of care in facility.





Recommendations/Counseling Done: 


1. Chin contusion secondary to ground-level fall. Patient with significant 

injury, continues with swelling we will continue to monitor secondary to concern

 for possible development of cellulitis.  Patient with pooling down her neck 

secondary to Xarelto use, this is second injury fall with concerns for bleeding 

risk.  Will discontinue Xarelto, counseling provided regarding risk and 

benefits.  Patient very impulsive and high risk for further injury.





2.  Dementia with behavioral disturbances.  Patient has intermittent 

neuropsychiatric behaviors of delusions, anxiety, and fluctuating mood.  At this

 point in time does not appear distressed, has settled somewhat into her 

setting, have discussed use of low-dose quetiapine in the past, at this point no

 indication to initiate.  We will continue to monitor.


3. Bilateral calluses on second toes.  Patient with deformed toes, with calluses

 causing intermittent discomfort.  No signs or symptoms of infection today, she 

is wearing soft shoes which are more comfortable.  The foot care nurse is here 

tomorrow, permission gotten from daughter to proceed with foot care.  Order 

written





4.  Advanced care planning.  Continue to discuss goals of care using a 

palliative care approach,  continues to deteriorate on hospice, patient 

with very little understanding.  At this point time she thinks he is already 

 and is not distressed.  She is receiving intermittent support from the 

hospice chaplain.





45 minutes with greater than 50% of this done in counseling regarding goals of 

care, follow-up with family regarding weighing benefits and burdens of 

interventions, coordination of care with facility staff.

## 2021-11-11 ENCOUNTER — HOSPITAL ENCOUNTER (OUTPATIENT)
Dept: HOSPITAL 76 - PC | Age: 86
Discharge: HOME | End: 2021-11-11
Attending: NURSE PRACTITIONER
Payer: MEDICARE

## 2021-11-11 DIAGNOSIS — R32: ICD-10-CM

## 2021-11-11 DIAGNOSIS — Z66: ICD-10-CM

## 2021-11-11 DIAGNOSIS — Z51.5: Primary | ICD-10-CM

## 2021-11-11 DIAGNOSIS — G89.29: ICD-10-CM

## 2021-11-11 DIAGNOSIS — E11.9: ICD-10-CM

## 2021-11-11 DIAGNOSIS — F43.21: ICD-10-CM

## 2021-11-11 DIAGNOSIS — R53.83: ICD-10-CM

## 2021-11-11 DIAGNOSIS — F03.91: ICD-10-CM

## 2021-11-11 DIAGNOSIS — F41.9: ICD-10-CM

## 2021-11-11 DIAGNOSIS — Z86.73: ICD-10-CM

## 2021-11-11 DIAGNOSIS — F32.9: ICD-10-CM

## 2021-11-11 DIAGNOSIS — M81.0: ICD-10-CM

## 2021-11-11 DIAGNOSIS — H91.90: ICD-10-CM

## 2021-11-11 DIAGNOSIS — M54.9: ICD-10-CM

## 2021-11-11 DIAGNOSIS — H35.30: ICD-10-CM

## 2021-11-11 DIAGNOSIS — Z91.81: ICD-10-CM

## 2021-11-11 DIAGNOSIS — K52.9: ICD-10-CM

## 2021-11-11 DIAGNOSIS — L84: ICD-10-CM

## 2021-11-11 NOTE — CONSULTATION NOTE
Palliative Care Follow Up





- Referral


Referring Provider: Daja Sargent PA-C


Time of Visit: 2464-8733


Referral setting: Assisted living


Referral Reason: Dementia





- Information Sources


Records reviewed: Previous records reviewed


History/Review of Systems obtained from: Patient, Family (checked in with 

daughter Thao), Caregiver


Exam limitations: Clinical condition (patient with dementia)





- History of Present Illness Update


Brief HPI Update: 


This is an 87-year-old woman had a series of unfortunate events, she is a 

residency currently of [facility].  She has been settling 

in with doing fairly well overall, though her  has since passed.  She is 

aware of this.  She is somewhat confused in the context of needing to get her 

car and go for a trip, and take care of the home.  Though she does understand 

her daughters are helping with this, and thinks her home may have already sold.





She has not had any further falls, she does perceive that she does have balance 

problems.  She reports her feet are better, on examination they do appear 

improved, she does have 1 small callus on the top of her second right toe that 

is dried.  Her weight is remained fairly stable, she has had no further 

infections or signs or symptoms of TIA.  She was more than willing to engage in 

conversation, and try to be reflective of what is currently happening.  Vital 

signs have been stable, and has not shown any further wandering behaviors.





Past Medical History: 


Hypertension, CAD, atrial fib, dementia, type 2 diabetes, GERD, chronic 

diarrhea, incontinence, macular degeneration, depression, osteoarthritis, 

osteoporosis, fatigue, chronic back pain, "abdominal polyps"








Social History





- Living Situation


Living arrangement: Assisted living (Memory Care)


Support System: 


Patient's  has since passed, he was on hospice.  Her daughters Sasha and 

Randi are trying to clean up their house, it is quite a big project.  She has a 

daughter Kelle who lives in [place], who tries to come weekly and visit, Randi

her daughter [place] has been out frequently to help wrap-up affairs as well.








Medications/Allergies





- Medications


Home Medications: 


                                Ambulatory Orders











 Medication  Instructions  Recorded  Confirmed


 


Metoprolol Succinate [Toprol Xl] 12.5 mg PO DAILY 21


 


Sertraline [Zoloft] 12.5 mg PO DAILY 21


 


Acetaminophen [Acetaminophen Extra 500 mg PO Q4HR PRN 21





Strength]   


 


Famotidine [Pepcid] 20 mg PO BID 21


 


Loperamide [Imodium] 2 mg PO DAILY PRN 21


 


Ibuprofen 400 mg PO Q6HR PRN 21














- Allergies


Allergies/Adverse Reactions: 


                                    Allergies











Allergy/AdvReac Type Severity Reaction Status Date / Time


 


No Known Drug Allergies Allergy   Verified 19 11:20














Review of Systems





- Constitutional


Constitutional: reports: Fatigue, Weight loss (118.2 about 2 pound wt loss).  

denies: Fever, Chills





- Eyes


Eyes: reports: Vision loss





- Ears, Nose & Throat


Ears, Nose & Throat: reports: Hearing loss, Hearing aids (doesn't wear)





- Cardiovascular


Cardiovascular: reports: Irregular heart rate, Exertional dyspnea.  denies: 

Chest pain, Edema





- Respiratory


Respiratory: reports: SOB with exertion.  denies: SOB at rest





- Gastrointestinal


Gastrointestinal: reports: Good appetite.  denies: Abdominal pain, Diarrhea 

(denies diarrhea)





- Genitourinary


Genitourinary: reports: Incontinence.  denies: Dysuria





- Musculoskeletal


Musculoskeletal: reports: Muscle aches, Stiffness, Muscle weakness





- Integumentary


Integumentary: reports: Dryness





- Neurological


Neurological: reports: General weakness, Memory problems (worsening;fluctuating)





- Psychiatric


Psychiatric: reports: Depression, Anxiety, Delusions.  denies: Aggitation





- Endocrine


Endocrine: reports: Diabetes type 2





- All Other Systems


All Other Systems: reports: Other (limited ROS related to memory)





Physical Exam





- Vital Signs


Temperature: 97.2 C


Pulse Rate: 64


Respiratory Rate: 18


O2 Saturation: 97 (ra @ rest)


Blood Pressure: 108/62





- Physical Exam


General Appearance: positive: No acute distress, Alert.  negative: Anxious


Eyes Bilateral: positive: Normal inspection


ENT: positive: No signs of dehydration


Neck: positive: Trachea midline


Cardiovascular: positive: Irregular


Respiratory: positive: No respiratory distress, Breath sounds nml


Abdomen: positive: Non-tender, Soft, Nml bowel sounds


Skin: positive: Pallor, Dryness


Extremities: positive: No pedal edema


Neurologic/Psychiatric: positive: Mood/affect nml, Disoriented to person, 

Disoriented to place, Disoriented to time





Palliative Care





- POLST


Patient has POLST: Yes


POLST Status: DNR, Selective Treatment


Pain: No pain


Feelings of wellbeing/Perceived Quality of Life: Good, Acceptable, Improved 

(daughter feels qol better)


Performance Status: 


Patient is ambulatory, does need some cueing sometimes for meal time.  She does 

receive assistance and oversight for dressing at times, but can dress herself.  

She does have standby assist for bathing.  She can feed herself.








- Palliative Care


Discussion: 


Try to explore patient's current understanding of her situation, she does share 

with me her  has , she does understand there "lots of houses" and the

 kids are taking care of this.  She reports everything is making her sad but she

 is not unhappy.  She feels like she is "bouncing from situation to situation". 

 She does feel like she is making progress on things and then goes on to talk 

about wanting to find a teaching job and driving her car, she reports her car is

 getting 6 to see speak.  She wants to do something productive.





She reports she cannot read, but she likes to watch people this provides her 

entertainment.  She reports no distress that people are quite nice to her here. 

 But still considers this somewhat of a temporary situation.  She reports when 

she gets frustrated she just reminds herself how grateful she is for her current

 situation.  She does say to me that she feels like she is got freedom here, but

 is unable to find that more so.





Spoke with Thao previous appointment, feels like she is settling in, does seem 

like she might often talk about going home but does not seem to feel like there 

is more conversation about acting on this.  She does perceive she understands 

her has been his dad.  She is pleased to see her somewhat settled there.








Impression and Recommendations





- Palliative Care


Impression: 


This is a 87-year-old woman who presents with dementia, most likely Alzheime

r's/vascular mixed with continued concern for recurrent TIAs and/or other 

neurologic events.  She has been fairly steady over the last several weeks, 

despite the death of her .  Patient had had a very seriously fall has 

been off Xarelto without any sequela at this point in time.  Goals remain to 

focus on comfort focused treatment and avoiding hospitalization.  Palliative 

care providing support for symptom management and coordination of care in 

facility.





Recommendations/Counseling Done: 


1. 1.  Dementia with behavioral disturbances.  Patient has intermittent mayte

ropsychiatric behaviors of delusions, anxiety, and fluctuating mood.  At this 

point in time does not appear distressed has been settling in, no resistant 

behaviors.  We will continue to monitor.





2.  Grief.  Patient is aware of loss of , she is being supported also by 

the hospice chaplain who will follow her on the palliative care team.  Patient 

is able to say she is sad but not distressed.  She does have some awareness of 

her confusion.





3.  Callus on second right toe.  Patient with deformed toes, calluses cause 

intermittent discomfort at this point in time her feet look much improved.  She 

has no signs or symptoms of infection is wearing soft slippers.  She is 

receiving foot care through the facility.





4.  Advanced care planning.  Continue to approach with goals of care focusing on

 comfort, quality of life, and decreasing any distress.  Patient does have POLST

 in place with DN AR and comfort focused treatment with goal to avoid 

hospitalization.  Patient does appear well cared for and safe in current 

facility.  She does remain at high risk for falls, and for further TIAs given 

her history.





30 minutes with greater than 50% of this done in coordination of care facility, 

counseling and support given to patient, and anticipatory guidance with daughter

## 2021-12-16 ENCOUNTER — HOSPITAL ENCOUNTER (OUTPATIENT)
Dept: HOSPITAL 76 - PC | Age: 86
Discharge: HOME | End: 2021-12-16
Attending: NURSE PRACTITIONER
Payer: MEDICARE

## 2021-12-16 DIAGNOSIS — L84: ICD-10-CM

## 2021-12-16 DIAGNOSIS — N39.0: ICD-10-CM

## 2021-12-16 DIAGNOSIS — F03.91: ICD-10-CM

## 2021-12-16 DIAGNOSIS — Z51.5: Primary | ICD-10-CM

## 2021-12-16 DIAGNOSIS — Z66: ICD-10-CM

## 2021-12-16 NOTE — CONSULTATION NOTE
Palliative Care Follow Up





- Referral


Referring Provider: Daja Sargent PA-C


Time of Visit: 


Referral setting: Assisted living


Referral Reason: UTI/AMS/Dementia





- Information Sources


Records reviewed: RN notes reviewed, Previous records reviewed


History/Review of Systems obtained from: Patient, Nursing


Exam limitations: Clinical condition (patient with mod/severe dementia)





- History of Present Illness Update


Brief HPI Update: 


This is an 87-year-old woman with dementia, who is a resident of [facility].  I did get a call regarding patient's worsening behaviors, she

did have a slightly positive dip, we did collect a UA, which did not help as far

as defining the underlying problem.  It was fairly benign and the urine was 

clear yellow but did show 3+ glucose and 2+ occult blood, no WBCs, no bacteria, 

final report was mixed urogenital noreen less than 10,000 colonies.  She had 

spiked a temperature though on 12/15 of 99.8 which did respond to acetaminophen.

 She does appear slightly pale, less energy, gait is somewhat shuffled.  She 

does not complain of any pain or discomfort, though reports she had wet the bed 

last night.  She denies dysuria.  She knows they have been looking at her urine 

but cannot really tell me any urinary symptoms.  She is wearing pads.  Staff 

report various versions of altered mental status versus doing just fine.  

Patient had just had her hair done and was feeling somewhat pleased.  She is 

having hallucinations/delusions, thinking her father was taking her fishing.  

She was worried about money but could not tell me why.  None is somewhat 

disoriented to place today.





She was complaining of her feet hurting, she had her bare feet and some fairly r

ough shoes.  She does still have a scabbed area on her second toe, no redness or

tenderness.  Did put socks on with resolution of discomfort.





Patient unable to really explain much what is going on with her, that she does 

not "like this place" but cannot really tell me what it is about.  She reports 

people are nice, the food is been okay.  She does admit to some increased 

confusion, but reports this has to do with her age.





Past Medical History: 


Hypertension, CAD, atrial fib, dementia, type 2 diabetes, GERD, chronic 

diarrhea, incontinence, macular degeneration, depression, osteoarthritis, 

osteoporosis, fatigue, chronic back pain, "abdominal polyps" 





Social History





- Living Situation


Living arrangement: Assisted living (Memory Care)


Living Situation: With spouse/s.o.


Support System: 


Patient is supported by her daughters Sasha and Randi, and her Sister Kelle.  

Patient's  has passed away he is on hospice.  Palliative care  is

continue to support her as well.  They are trying to wrap up think clean up the 

house and household, this is been an overwhelming project for everybody but they

are pacing themselves.








Medications/Allergies





- Medications


Home Medications: 


                                Ambulatory Orders











 Medication  Instructions  Recorded  Confirmed


 


Metoprolol Succinate [Toprol Xl] 12.5 mg PO DAILY 21


 


Sertraline [Zoloft] 12.5 mg PO DAILY 21


 


Acetaminophen [Acetaminophen Extra 500 mg PO Q4HR PRN 21





Strength]   


 


Famotidine [Pepcid] 20 mg PO BID 21


 


Loperamide [Imodium] 2 mg PO DAILY PRN 21


 


Ibuprofen 400 mg PO Q6HR PRN 21


 


Ciprofloxacin HCl [Cipro] 500 mg PO BID MDD 7 days 21














- Allergies


Allergies/Adverse Reactions: 


                                    Allergies











Allergy/AdvReac Type Severity Reaction Status Date / Time


 


No Known Drug Allergies Allergy   Verified 19 11:20














Review of Systems





- Constitutional


Constitutional: reports: Fatigue, Weight stable (120.6).  denies: Fever, Chills





- Eyes


Eyes: reports: Vision loss





- Ears, Nose & Throat


Ears, Nose & Throat: reports: Hearing loss, Hearing aids (doesn't wear)





- Cardiovascular


Cardiovascular: reports: Irregular heart rate, Exertional dyspnea.  denies: 

Chest pain, Edema





- Respiratory


Respiratory: reports: SOB with exertion.  denies: SOB at rest





- Gastrointestinal


Gastrointestinal: reports: Good appetite.  denies: Abdominal pain, Diarrhea 

(denies diarrhea)





- Genitourinary


Genitourinary: reports: Incontinence.  denies: Dysuria





- Musculoskeletal


Musculoskeletal: reports: Muscle aches, Stiffness, Muscle weakness





- Integumentary


Integumentary: reports: Dryness





- Neurological


Neurological: reports: General weakness, Memory problems (worsening;fluctuating)





- Psychiatric


Psychiatric: reports: Depression, Anxiety, Delusions.  denies: Aggitation





- Endocrine


Endocrine: reports: Diabetes type 2





- All Other Systems


All Other Systems: reports: Other (limited ROS related to memory)





Physical Exam





- Vital Signs


Temperature: 97.3 C


Pulse Rate: 83


Respiratory Rate: 18


O2 Saturation: 99


Blood Pressure: 92/62





- Physical Exam


General Appearance: positive: No acute distress, Alert, Other (appears with less

 energy).  negative: Anxious


Eyes Bilateral: positive: Normal inspection


ENT: positive: No signs of dehydration


Neck: positive: Trachea midline


Cardiovascular: positive: Irregular


Respiratory: positive: No respiratory distress, Breath sounds nml


Abdomen: positive: Soft, Nml bowel sounds, Tenderness (mild tenderness)


Skin: positive: Pallor, Dryness


Extremities: positive: No pedal edema


Neurologic/Psychiatric: positive: Mood/affect nml, Disoriented to person, 

Disoriented to place, Disoriented to time, Flat affect





Palliative Care





- POLST


Patient has POLST: Yes


POLST Status: DNR, Comfort Measures


Pain: No pain


Performance Status: 


Gait slightly shuffled, walks slow and steady. Patient is able to walk laps, she

 is always been an exerciser.  She is having a lot of trouble with word finding 

today, she is able to feel food.  She does need some assistance and cueing for t

[place].  She does like to sit and participate in activities.  She does need 

assistance with bathing.  She can self feed








- Palliative Care


Discussion: 


Patient seems a little less than this ER psych and more sad today.  She seems a 

little bit more confused and difficulty with word finding.  Unclear if this is 

related to possible UTI or if continue to progress in her dementia.  She does 

appear somewhat pale.  She denies any distress, she is talking a lot about her 

dad recognizing he is dead but then talks about him taking her somewhere.  Was 

talking more in circles than she has in the past as far as social conversation





Patient does have a POLST with DNI/DNR and comfort measures, plan is to treat 

things for quality of life i.e. UTIs but not do anything to prolong suffering.  

Their father recently  on hospice they are familiar with the services and 

help for patient to have a ease of transition when it is her time.








Impression and Recommendations





- Palliative Care


Impression: 


This is an 87-year-old woman who presents with dementia, most likely 

Alzheimer's/vascular mixed with concern for recurrent TIAs and other neurologic 

events.  She has been fairly steady over the last several weeks.  She had some 

altered mental status the other day, did spike a small temp, UA dipped positive 

but the UA does not show WBCs or bacteria but positive for glucose and red blood

 cells.  Will go ahead given patient's past history of UTIs and symptoms to 

treat with the Cipro.  Unfortunately did not get started yesterday.  Palliative 

care continue provide support for symptom management coordination of care in 

facility.





Recommendations/Counseling Done: 


1. UTI.  Patient does have a history of UTIs, did initiate Cipro 500 mg twice 

daily x7 days based on urine dip and symptoms.  Patient's urine not strongly 

positive, shows mixed urogenital noreen with less than 10,000 colonies, but had 

improved last time when was treated.  Given her coming into the weekend, will go

 ahead and treat, and discontinue 5 days if no further symptoms.  Patient can be

 discontinued off of it if she has adverse response, diarrhea, or nausea.





2.  Dementia with behavioral disturbances.  Patient has intermittent 

neuropsychiatric behaviors of delusions, anxiety, and fluctuating mood.  At this

 point in time does not appear in enough distress or resistant behaviors to add 

any pharmacologic's.  They have staff are good at redirecting.





3.  Callus on second right toe.  Patient has deformed toes, calluses continue to

 cause intermittent discomfort, patient had shoes on without socks which was 

causing more problems.  She is receiving foot care through the facility.  

Assisted with socks on and resolved discomfort.





4.  Advanced care planning.  Continue to approach with goals of care focusing on

 comfort and quality of life and decreasing any distress.  Patient does have 

POLST in place with DN AR and comfort focused treatment with goal to avoid 

hospitalization.  Patient appears well cared for and safe in her current 

facility.  She remains at high risk for falls and further TIAs given her history

 but so far no further events.





30 minutes with greater than 50% of this done in follow-up and coordination of 

care with facility.  Anticipatory guidance and follow-up with daughter

## 2022-01-18 ENCOUNTER — HOSPITAL ENCOUNTER (OUTPATIENT)
Dept: HOSPITAL 76 - PC | Age: 87
Discharge: HOME | End: 2022-01-18
Attending: NURSE PRACTITIONER
Payer: MEDICARE

## 2022-01-18 DIAGNOSIS — B37.0: ICD-10-CM

## 2022-01-18 DIAGNOSIS — M79.604: ICD-10-CM

## 2022-01-18 DIAGNOSIS — Z51.5: Primary | ICD-10-CM

## 2022-01-18 DIAGNOSIS — M79.605: ICD-10-CM

## 2022-01-18 DIAGNOSIS — F03.90: ICD-10-CM

## 2022-01-18 DIAGNOSIS — R63.4: ICD-10-CM

## 2022-01-18 DIAGNOSIS — E86.0: ICD-10-CM

## 2022-01-18 DIAGNOSIS — Z66: ICD-10-CM

## 2022-01-18 NOTE — CONSULTATION NOTE
Palliative Care Follow Up





- Referral


Referring Provider: Daja Sargent PA-C


Time of Visit: 5686-5535


Referral setting: Assisted living


Referral Reason: Protien Calorie Malnutrtion/Dementia/Oral Candidiasis/Hx of 

Covid





- Information Sources


Records reviewed: Previous records reviewed


History/Review of Systems obtained from: Patient, Nursing


Exam limitations: Clinical condition (patient unable to participate in exam)





- History of Present Illness Update


Brief HPI Update: 


This is a 87-year-old woman with dementia, asked to see secondary to symptoms of

failure to thrive and doing very poorly.  Patient 1/ 3, had been reported to 

have an episode of vomiting over the weekend, had been exposed to a staff who 

was COVID-positive.  She had tested negative on the Friday prior, but tested 

positive on 1/ 3. Patient did not have a fever at that time, was not hypoxic, 

but was demonstrated decreased appetite.  Unfortunately she had to be 

quarantined and isolated, and continued to have decreased intake.  I was 

contacted at 1/10 she was febrile at 99.8, concern for UTI given patient's 

recent treatment mid December, they were to collect a UA, this did not happen in

a timely way, but patient was treated empirically with Cipro 500 mg twice daily 

as she had responded to this previously.  Reports we had received this patient 

was also not eating well, did not have a current weight, but did order health 

shakes 3 times a day with meals as well.  In follow-up patient had been doing 

better, until today there was concern the patient was not eating or drinking for

24 to 48 hours, quite weak, and has continued to decline functionally and 

cognitively.





On arrival, nurse said patient having difficulty with swallowing, but was able 

to take medication with encouragement.  On examination patient has significant 

oral candidiasis in her oral cavity, tongue is coated, buccal area, and into the

phayrngeal.  She is having trouble with water, but was able to drink a health 

shake for me with encouragement.  She does appear quite dehydrated, lips are 

dry, skin turgor is poor, and presents with elevated heart rate of 96.  Patient 

does need assistance from  laying to sitting, lungs were clear, abdomen was soft

and nontender.  Patient did not have any signs or symptoms of vaginal 

candidiasis, slight pink area on her coccyx.  She was quite painful when I 

touched her right lower leg, does have some venous stasis changes, and left foot

slightly reddened.  Patient does have history of bunions and skin issues, no 

acute issues noted at time though it is sensitive particularly right lower 

extremity on examination.  Patient was unable to weight-bear, she was a two-

person assist to the wheelchair, did go and obtained weight.  Patient was 109, 

on 12/2 she was 119.8.  Patient's oximetry was 98%. Patient is off quarantine, 

but has not been going to meals, has been mostly bedbound, she is able to say a 

few words, but previously was talking in full sentences and able to engage in 

conversation.  She does appear to still be able to focus and recognize, she is 

attempting conversation. According to staff most of the acute changes were in 

the last 24 to 48 hours.She has received 7 for 10 days of Cipro, unclear if 

patient has been toileting secondary to weakness.





Patient does have baseline dementia, had been living with her  who has 

since passed away.  Prior to his passing, they did attempt assisted living, but 

her dementia is fairly advanced, she did have some mild paranoia and delusions, 

but has always been quite pleasant and cooperative with staff.  She has been 

ambulatory, able to engage in conversations, and actually settled in quite well 

enjoyed her new environment.





Past Medical History: 


Pretension, CAD, atrial fib, dementia, type 2 diabetes, GERD, chronic diarrhea, 

incontinence, macular degeneration, depression, osteoarthritis, fatigue, chronic

back pain,








Social History





- Living Situation


Living arrangement: Assisted living ([facility])


Support System: 


Patient is supported by her daughters Thao and Randi, and her sister, Kelle.  

Patient's  passed away on hospice recently, is receiving support from the

palliative care .  The daughters are trying to wrap up the house, though

in talking with Thao, would be able to accommodate patient to transition back 

home for hospice, there is enough furniture and equipment etc. and home to 

support family care and caregivers.  Relationship with her  was quite 

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 

frequently. She was very distressed with loss of her son a few years ago. 








Medications/Allergies





- Medications


Home Medications: 


                                Ambulatory Orders











 Medication  Instructions  Recorded  Confirmed


 


Metoprolol Succinate [Toprol Xl] 12.5 mg PO DAILY 04/07/21 01/18/22


 


Sertraline [Zoloft] 12.5 mg PO DAILY 04/07/21 01/18/22


 


Acetaminophen [Acetaminophen Extra 500 mg PO Q4HR PRN 07/07/21 01/18/22





Strength]   


 


Famotidine [Pepcid] 20 mg PO BID 07/07/21 01/18/22


 


Loperamide [Imodium] 2 mg PO DAILY PRN 07/07/21 01/18/22


 


Ibuprofen 400 mg PO Q6HR PRN 11/11/21 01/18/22


 


Fluconazole [Diflucan] 100 mg PO . X2 Q 3 DAYS 01/18/22 01/18/22


 


Sulfamethox/Trimeth 800/160 1 tab PO BID MDD 3 days 01/18/22 01/18/22





[Bactrim Ds]   














- Allergies


Allergies/Adverse Reactions: 


                                    Allergies











Allergy/AdvReac Type Severity Reaction Status Date / Time


 


No Known Drug Allergies Allergy   Verified 07/09/19 11:20














Review of Systems





- Constitutional


Constitutional: reports: Fatigue (worsening), Weakness, Poor appetite, Weight 

loss (120.6 12/16 visit; 109 today).  denies: Fever, Chills





- Eyes


Eyes: reports: Vision loss





- Ears, Nose & Throat


Ears, Nose & Throat: reports: Hearing loss, Hearing aids (doesn't wear), Mouth 

lesions, Dry mouth, Other (difficulty with swallowing; pocketing of water)





- Cardiovascular


Cardiovascular: reports: Irregular heart rate, Exertional dyspnea.  denies: 

Chest pain, Edema





- Respiratory


Respiratory: denies: SOB at rest





- Gastrointestinal


Gastrointestinal: reports: Poor appetite.  denies: Abdominal pain, Diarrhea 

(denies diarrhea), Vomiting





- Genitourinary


Genitourinary: reports: Incontinence





- Musculoskeletal


Musculoskeletal: reports: Muscle aches, Stiffness, Muscle weakness, Transfer 

issues (two person transfer to wheelchair time of visit)





- Integumentary


Integumentary: reports: Dryness





- Neurological


Neurological: reports: General weakness, Memory problems (only few 

words/nonsensical at visit)





- Psychiatric


Psychiatric: reports: Depression, Anxiety, Delusions.  denies: Aggitation





- Endocrine


Endocrine: reports: Diabetes type 2





- Hematologic/Lymphatic


Hematologic/Lymph: reports: Bruising





- All Other Systems


All Other Systems: reports: Other (limited ROS)





Physical Exam





- Vital Signs


Temperature: 97.5 C


Pulse Rate: 96


Respiratory Rate: 16


O2 Saturation: 98 (ra @ rest)


Blood Pressure: 122/72 (laying)





- Physical Exam


General Appearance: positive: Lethargic, Cachetic.  negative: Anxious


Eyes Bilateral: positive: Normal inspection


ENT: positive: Dry mucous membranes, Other (oral candidiasis)


Neck: positive: Trachea midline


Cardiovascular: positive: Irregular


Respiratory: positive: No respiratory distress, Breath sounds nml


Abdomen: positive: Non-tender, Soft, Nml bowel sounds.  negative: Guarding


Skin: positive: Pallor, Dryness, Pressure wound (slightly pinkened coccyx), 

Other (venous stasis changes LE; dried callous top of left toe; no open areas)


Extremities: positive: No pedal edema, Other (very painful to touch RLE; unable 

to identify swelling or source)


Neurologic/Psychiatric: positive: Disoriented to person, Disoriented to place, 

Disoriented to time, Slurred/abnml speech, Flat affect





Palliative Care





- POLST


Patient has POLST: Yes


POLST Status: DNR, Comfort Measures


Pain: Location (RLE sensitive to touch when examined)


Feelings of wellbeing/Perceived Quality of Life: Poor, Worsening


Performance Status: 


Patient has had a slow decline in functional status over the last few weeks, 

more acutely over the last 24 to 48 hours needing 2 person assist for transfer 

to wheelchair on exam. Difficulty getting from sitting to standing, is quite 

weak, cachectic. Had been working with therapy, previous level of functioning 

had been ambulatory independently in facility as well as independent toileting.








- Palliative Care


Discussion: 


Reviewed findings with daughter Thao, concern for patient in the context of 

acute decline. Kelle is struggling appropriately, has not been able to see her 

given COVID restrictions, and patient's recent isolation. She is quite 

frustrated, and not wanting her to "die alone". She will talk to her sister 

regarding possibly bringing her home, to provide end-of-life care. Patient has 

significant weight loss, decline in cognitive and functional status, is 

dehydrated and weak. Patient would at this point qualify for hospice, 

particularly in the context of goals to focus on comfort. Agreed would go ahead 

and treat her oral candidiasis, try to rehydrate orally, and focus on comfort. 

Given the goals of care, we will go ahead and transition to hospice, hospice 

referral made. Will follow up on interventions and 2 days, to see if patient 

responding at all, depending on expected prognosis may transition her home for 

end of life care. Spoke with April TOTH, will make arrangements given pending

hospice referral for daughter to see patient, can decide after speaks with 

sister and sees patient if wants like to care for her at home versus continue at

[facility].








Impression and Recommendations





- Palliative Care


Impression: 


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

r's/vascular mixed with concerns previously for recurrent TIAs and other 

neurologic events. She did test positive for COVID 1/3, and has had ongoing 

functional and cognitive decline over the last couple weeks,. Concern for 

temperature spike and history of UTI, was treated with Cipro, now have some oral

candidiasis adding to her worsening intake. Patient has lost 10 pounds over the 

previous month, is mostly bedbound, and cognitively worse. Palliative care 

providing support, clarification of goals of care, and transition to hospice in 

alignment with comfort focused care.





Recommendations/Counseling Done: 


1. UTI. Patient does have a history of UTIs, she did spike a temp, was treated 

with Cipro 500 mg twice a day, unfortunately do not have a urine. Given patient 

to be put on Diflucan, will change to Septra trimethoprim 800/160 mg 1 tab twice

daily x3 days to finish out course. Though suspect at this point in time it is 

not UTIs.





2. Oral candidiasis. Patient would not be able to participate in nystatin swish 

and swallow, will medicated with Diflucan 100 mg, and repeat in 3 days and 

monitor for recovery.





3. Weight loss. This is multifactorial, including anorexia, isolation of 

quarantine, oral candidiasis, has not been eating for 24 to 48 hours. Patient 

presents with dehydration. At this point will treat oral candidiasis, encouraged

staff to offer patient health shakes/fluids every 2 hours while awake to attempt

oral rehydration.





4. Lower extremity pain. Patient has had intermittent skin issues, will continue

to monitor, at this point no identifiable source of discomfort though does seem 

distressed when touched. Does not appear uncomfortable with weightbearing, or 

laying down.





5. Dementia. Patient is fairly nonverbal with just a few words today. Is able to

make eye contact, tries to participate in conversation, previous baseline was 

conversational. We will continue to monitor. Patient was at high risk for TIAs 

given her underlying atrial fib and no anticoagulant, may have had another event

as well.





6. Advanced care planning. Spoke at length with Thao, will treat current 

symptoms, unclear if able to change current course of decline. Goals remain to 

focus on comfort, she is going to follow-up with her sister if they want to 

bring her home for hospice care. They recently cared for their father on 

hospice, reports this is a different commitment relationship with her mother. 

She will come and visit, will see how patient does in the next 24 to 48 hours, 

hospice referral made.





75 minutes with greater than 50% of this done and coordination of care, 

counseling with daughter, evaluation of patient, and anticipatory guidance.